# Patient Record
Sex: MALE | Race: WHITE | NOT HISPANIC OR LATINO
[De-identification: names, ages, dates, MRNs, and addresses within clinical notes are randomized per-mention and may not be internally consistent; named-entity substitution may affect disease eponyms.]

---

## 2020-03-25 PROBLEM — Z00.00 ENCOUNTER FOR PREVENTIVE HEALTH EXAMINATION: Status: ACTIVE | Noted: 2020-03-25

## 2020-04-07 ENCOUNTER — APPOINTMENT (OUTPATIENT)
Dept: SURGERY | Facility: CLINIC | Age: 65
End: 2020-04-07

## 2020-05-04 ENCOUNTER — APPOINTMENT (OUTPATIENT)
Dept: SURGERY | Facility: CLINIC | Age: 65
End: 2020-05-04
Payer: COMMERCIAL

## 2020-05-04 VITALS
DIASTOLIC BLOOD PRESSURE: 80 MMHG | SYSTOLIC BLOOD PRESSURE: 110 MMHG | WEIGHT: 160 LBS | HEIGHT: 66 IN | BODY MASS INDEX: 25.71 KG/M2

## 2020-05-04 DIAGNOSIS — R10.33 PERIUMBILICAL PAIN: ICD-10-CM

## 2020-05-04 DIAGNOSIS — I10 ESSENTIAL (PRIMARY) HYPERTENSION: ICD-10-CM

## 2020-05-04 DIAGNOSIS — Z72.0 TOBACCO USE: ICD-10-CM

## 2020-05-04 DIAGNOSIS — Z87.19 PERSONAL HISTORY OF OTHER DISEASES OF THE DIGESTIVE SYSTEM: ICD-10-CM

## 2020-05-04 PROCEDURE — 99203 OFFICE O/P NEW LOW 30 MIN: CPT

## 2020-05-04 RX ORDER — METFORMIN HYDROCHLORIDE 1000 MG/1
1000 TABLET, COATED ORAL
Refills: 0 | Status: ACTIVE | COMMUNITY

## 2020-05-04 RX ORDER — NIFEDIPINE 60 MG/1
60 TABLET, EXTENDED RELEASE ORAL
Refills: 0 | Status: ACTIVE | COMMUNITY

## 2020-05-04 RX ORDER — OMEPRAZOLE 40 MG/1
40 CAPSULE, DELAYED RELEASE ORAL
Refills: 0 | Status: ACTIVE | COMMUNITY

## 2020-05-04 RX ORDER — SACUBITRIL AND VALSARTAN 97; 103 MG/1; MG/1
97-103 TABLET, FILM COATED ORAL
Refills: 0 | Status: ACTIVE | COMMUNITY

## 2020-05-04 RX ORDER — METOPROLOL TARTRATE 50 MG/1
50 TABLET, FILM COATED ORAL
Refills: 0 | Status: ACTIVE | COMMUNITY

## 2020-05-04 NOTE — ASSESSMENT
[FreeTextEntry1] : After examination patient was explained about incisional hernia and options were given to the patient. Surgical option explaining in detail. Patient opted for surgery. After explaining the surgery informed consent was obtained. Surgery to be scheduled.

## 2020-05-04 NOTE — PHYSICAL EXAM
[No Rash or Lesion] : No rash or lesion [Alert] : alert [Oriented to Place] : oriented to place [Oriented to Person] : oriented to person [Oriented to Time] : oriented to time [Calm] : calm [de-identified] : 65-year-old male patient in no acute distress [de-identified] : patient has large rectus diastasis.patient also has large incisional hernia about the umbilicus. [de-identified] : Patient has large diastasis recti. Incisional hernia which is partially reducible around the belly button.

## 2020-05-04 NOTE — REVIEW OF SYSTEMS
[Constipation] : constipation [Negative] : Heme/Lymph [Abdominal Pain] : no abdominal pain [Vomiting] : no vomiting [Diarrhea] : no diarrhea

## 2020-05-04 NOTE — HISTORY OF PRESENT ILLNESS
[de-identified] : Patient presents to the office  with a history of bulge around his belly button. History of umbilical hernia surgery 20 years ago.

## 2020-05-04 NOTE — CONSULT LETTER
[Dear  ___] : Dear  [unfilled], [Consult Letter:] : I had the pleasure of evaluating your patient, [unfilled]. [Please see my note below.] : Please see my note below. [Sincerely,] : Sincerely, [Consult Closing:] : Thank you very much for allowing me to participate in the care of this patient.  If you have any questions, please do not hesitate to contact me. [FreeTextEntry3] : Yogesh Reyes MD, FACS\par [facility]\[place] [address]\par

## 2020-06-30 ENCOUNTER — RESULT REVIEW (OUTPATIENT)
Age: 65
End: 2020-06-30

## 2020-06-30 ENCOUNTER — OUTPATIENT (OUTPATIENT)
Dept: OUTPATIENT SERVICES | Facility: HOSPITAL | Age: 65
LOS: 1 days | Discharge: HOME | End: 2020-06-30
Payer: MEDICARE

## 2020-06-30 VITALS
OXYGEN SATURATION: 99 % | SYSTOLIC BLOOD PRESSURE: 153 MMHG | HEART RATE: 83 BPM | WEIGHT: 162.04 LBS | RESPIRATION RATE: 16 BRPM | HEIGHT: 66 IN | TEMPERATURE: 97 F | DIASTOLIC BLOOD PRESSURE: 89 MMHG

## 2020-06-30 DIAGNOSIS — K43.2 INCISIONAL HERNIA WITHOUT OBSTRUCTION OR GANGRENE: ICD-10-CM

## 2020-06-30 DIAGNOSIS — Z01.818 ENCOUNTER FOR OTHER PREPROCEDURAL EXAMINATION: ICD-10-CM

## 2020-06-30 DIAGNOSIS — R10.33 PERIUMBILICAL PAIN: ICD-10-CM

## 2020-06-30 DIAGNOSIS — Z98.890 OTHER SPECIFIED POSTPROCEDURAL STATES: Chronic | ICD-10-CM

## 2020-06-30 LAB
A1C WITH ESTIMATED AVERAGE GLUCOSE RESULT: 6.6 % — HIGH (ref 4–5.6)
ALBUMIN SERPL ELPH-MCNC: 4.6 G/DL — SIGNIFICANT CHANGE UP (ref 3.5–5.2)
ALP SERPL-CCNC: 99 U/L — SIGNIFICANT CHANGE UP (ref 30–115)
ALT FLD-CCNC: 19 U/L — SIGNIFICANT CHANGE UP (ref 0–41)
ANION GAP SERPL CALC-SCNC: 15 MMOL/L — HIGH (ref 7–14)
APPEARANCE UR: CLEAR — SIGNIFICANT CHANGE UP
APTT BLD: 31.8 SEC — SIGNIFICANT CHANGE UP (ref 27–39.2)
AST SERPL-CCNC: 20 U/L — SIGNIFICANT CHANGE UP (ref 0–41)
BASOPHILS # BLD AUTO: 0.06 K/UL — SIGNIFICANT CHANGE UP (ref 0–0.2)
BASOPHILS NFR BLD AUTO: 0.8 % — SIGNIFICANT CHANGE UP (ref 0–1)
BILIRUB SERPL-MCNC: 1.3 MG/DL — HIGH (ref 0.2–1.2)
BILIRUB UR-MCNC: NEGATIVE — SIGNIFICANT CHANGE UP
BUN SERPL-MCNC: 16 MG/DL — SIGNIFICANT CHANGE UP (ref 10–20)
CALCIUM SERPL-MCNC: 9.3 MG/DL — SIGNIFICANT CHANGE UP (ref 8.5–10.1)
CHLORIDE SERPL-SCNC: 99 MMOL/L — SIGNIFICANT CHANGE UP (ref 98–110)
CO2 SERPL-SCNC: 25 MMOL/L — SIGNIFICANT CHANGE UP (ref 17–32)
COLOR SPEC: YELLOW — SIGNIFICANT CHANGE UP
CREAT SERPL-MCNC: 1.1 MG/DL — SIGNIFICANT CHANGE UP (ref 0.7–1.5)
DIFF PNL FLD: NEGATIVE — SIGNIFICANT CHANGE UP
EOSINOPHIL # BLD AUTO: 0.13 K/UL — SIGNIFICANT CHANGE UP (ref 0–0.7)
EOSINOPHIL NFR BLD AUTO: 1.7 % — SIGNIFICANT CHANGE UP (ref 0–8)
ESTIMATED AVERAGE GLUCOSE: 143 MG/DL — HIGH (ref 68–114)
GLUCOSE SERPL-MCNC: 165 MG/DL — HIGH (ref 70–99)
GLUCOSE UR QL: SIGNIFICANT CHANGE UP
HCT VFR BLD CALC: 42.9 % — SIGNIFICANT CHANGE UP (ref 42–52)
HGB BLD-MCNC: 14.8 G/DL — SIGNIFICANT CHANGE UP (ref 14–18)
IMM GRANULOCYTES NFR BLD AUTO: 0.3 % — SIGNIFICANT CHANGE UP (ref 0.1–0.3)
INR BLD: 0.98 RATIO — SIGNIFICANT CHANGE UP (ref 0.65–1.3)
KETONES UR-MCNC: NEGATIVE — SIGNIFICANT CHANGE UP
LEUKOCYTE ESTERASE UR-ACNC: NEGATIVE — SIGNIFICANT CHANGE UP
LYMPHOCYTES # BLD AUTO: 2.26 K/UL — SIGNIFICANT CHANGE UP (ref 1.2–3.4)
LYMPHOCYTES # BLD AUTO: 29.4 % — SIGNIFICANT CHANGE UP (ref 20.5–51.1)
MCHC RBC-ENTMCNC: 32.9 PG — HIGH (ref 27–31)
MCHC RBC-ENTMCNC: 34.5 G/DL — SIGNIFICANT CHANGE UP (ref 32–37)
MCV RBC AUTO: 95.3 FL — HIGH (ref 80–94)
MONOCYTES # BLD AUTO: 0.74 K/UL — HIGH (ref 0.1–0.6)
MONOCYTES NFR BLD AUTO: 9.6 % — HIGH (ref 1.7–9.3)
NEUTROPHILS # BLD AUTO: 4.49 K/UL — SIGNIFICANT CHANGE UP (ref 1.4–6.5)
NEUTROPHILS NFR BLD AUTO: 58.2 % — SIGNIFICANT CHANGE UP (ref 42.2–75.2)
NITRITE UR-MCNC: NEGATIVE — SIGNIFICANT CHANGE UP
NRBC # BLD: 0 /100 WBCS — SIGNIFICANT CHANGE UP (ref 0–0)
PH UR: 7 — SIGNIFICANT CHANGE UP (ref 5–8)
PLATELET # BLD AUTO: 159 K/UL — SIGNIFICANT CHANGE UP (ref 130–400)
POTASSIUM SERPL-MCNC: 4.3 MMOL/L — SIGNIFICANT CHANGE UP (ref 3.5–5)
POTASSIUM SERPL-SCNC: 4.3 MMOL/L — SIGNIFICANT CHANGE UP (ref 3.5–5)
PROT SERPL-MCNC: 7.6 G/DL — SIGNIFICANT CHANGE UP (ref 6–8)
PROT UR-MCNC: SIGNIFICANT CHANGE UP
PROTHROM AB SERPL-ACNC: 11.3 SEC — SIGNIFICANT CHANGE UP (ref 9.95–12.87)
RBC # BLD: 4.5 M/UL — LOW (ref 4.7–6.1)
RBC # FLD: 12.7 % — SIGNIFICANT CHANGE UP (ref 11.5–14.5)
SODIUM SERPL-SCNC: 139 MMOL/L — SIGNIFICANT CHANGE UP (ref 135–146)
SP GR SPEC: 1.02 — SIGNIFICANT CHANGE UP (ref 1.01–1.02)
UROBILINOGEN FLD QL: SIGNIFICANT CHANGE UP
WBC # BLD: 7.7 K/UL — SIGNIFICANT CHANGE UP (ref 4.8–10.8)
WBC # FLD AUTO: 7.7 K/UL — SIGNIFICANT CHANGE UP (ref 4.8–10.8)

## 2020-06-30 PROCEDURE — 71046 X-RAY EXAM CHEST 2 VIEWS: CPT | Mod: 26

## 2020-06-30 PROCEDURE — 93010 ELECTROCARDIOGRAM REPORT: CPT

## 2020-06-30 NOTE — H&P PST ADULT - NSICDXPASTMEDICALHX_GEN_ALL_CORE_FT
PAST MEDICAL HISTORY:  BPH (benign prostatic hyperplasia)     DM (diabetes mellitus)     High cholesterol     HTN (hypertension)     PVD (peripheral vascular disease)

## 2020-06-30 NOTE — H&P PST ADULT - HISTORY OF PRESENT ILLNESS
Pt states he had this surgery in the past but hernia returned. Denies any chest pain, difficulty breathing, SOB, palpitations, dysuria, URI, or any other infections in the last 2 weeks. Denies any recent travel, contact, or exposure to any persons with known or suspected COVID-19. Pt also denies COVID testing within the last 2 weeks. Exercise tolerance of 1 flights of stairs without dyspnea. GUY reviewed with patient.

## 2020-07-17 PROBLEM — E78.00 PURE HYPERCHOLESTEROLEMIA, UNSPECIFIED: Chronic | Status: ACTIVE | Noted: 2020-06-30

## 2020-07-17 PROBLEM — N40.0 BENIGN PROSTATIC HYPERPLASIA WITHOUT LOWER URINARY TRACT SYMPTOMS: Chronic | Status: ACTIVE | Noted: 2020-06-30

## 2020-07-17 PROBLEM — I10 ESSENTIAL (PRIMARY) HYPERTENSION: Chronic | Status: ACTIVE | Noted: 2020-06-30

## 2020-07-17 PROBLEM — I73.9 PERIPHERAL VASCULAR DISEASE, UNSPECIFIED: Chronic | Status: ACTIVE | Noted: 2020-06-30

## 2020-07-17 PROBLEM — E11.9 TYPE 2 DIABETES MELLITUS WITHOUT COMPLICATIONS: Chronic | Status: ACTIVE | Noted: 2020-06-30

## 2020-07-24 ENCOUNTER — OUTPATIENT (OUTPATIENT)
Dept: OUTPATIENT SERVICES | Facility: HOSPITAL | Age: 65
LOS: 1 days | Discharge: HOME | End: 2020-07-24

## 2020-07-24 VITALS
HEART RATE: 77 BPM | WEIGHT: 160.94 LBS | RESPIRATION RATE: 18 BRPM | TEMPERATURE: 96 F | SYSTOLIC BLOOD PRESSURE: 138 MMHG | OXYGEN SATURATION: 98 % | HEIGHT: 66 IN | DIASTOLIC BLOOD PRESSURE: 92 MMHG

## 2020-07-24 DIAGNOSIS — Z98.890 OTHER SPECIFIED POSTPROCEDURAL STATES: Chronic | ICD-10-CM

## 2020-07-24 DIAGNOSIS — Z01.818 ENCOUNTER FOR OTHER PREPROCEDURAL EXAMINATION: ICD-10-CM

## 2020-07-24 DIAGNOSIS — K43.2 INCISIONAL HERNIA WITHOUT OBSTRUCTION OR GANGRENE: ICD-10-CM

## 2020-07-24 LAB
APPEARANCE UR: CLEAR — SIGNIFICANT CHANGE UP
BILIRUB UR-MCNC: NEGATIVE — SIGNIFICANT CHANGE UP
COLOR SPEC: SIGNIFICANT CHANGE UP
DIFF PNL FLD: NEGATIVE — SIGNIFICANT CHANGE UP
GLUCOSE UR QL: ABNORMAL
KETONES UR-MCNC: NEGATIVE — SIGNIFICANT CHANGE UP
LEUKOCYTE ESTERASE UR-ACNC: NEGATIVE — SIGNIFICANT CHANGE UP
NITRITE UR-MCNC: NEGATIVE — SIGNIFICANT CHANGE UP
PH UR: 7 — SIGNIFICANT CHANGE UP (ref 5–8)
PROT UR-MCNC: SIGNIFICANT CHANGE UP
SP GR SPEC: 1.01 — SIGNIFICANT CHANGE UP (ref 1.01–1.02)
UROBILINOGEN FLD QL: SIGNIFICANT CHANGE UP

## 2020-07-24 NOTE — H&P PST ADULT - HISTORY OF PRESENT ILLNESS
PATIENT CURRENTLY DENIES CHEST PAIN  SHORTNESS OF BREATH PALPITATIONS,  DYSURIA, OR UPPER RESPIRATORY INFECTION IN PAST 2 WEEKS  EXERCISE  TOLERANCE  1-2 FLIGHT OF STAIRS  WITHOUT SHORTNESS OF BREATH     denies travel outside the [place] in the past 30 days     pt denies any covid s/s, or tested positive in the past 2 weeks

## 2020-07-26 LAB
CULTURE RESULTS: NO GROWTH — SIGNIFICANT CHANGE UP
SPECIMEN SOURCE: SIGNIFICANT CHANGE UP

## 2020-08-03 ENCOUNTER — APPOINTMENT (OUTPATIENT)
Dept: SURGERY | Facility: HOSPITAL | Age: 65
End: 2020-08-03

## 2020-08-11 ENCOUNTER — APPOINTMENT (OUTPATIENT)
Dept: SURGERY | Facility: CLINIC | Age: 65
End: 2020-08-11

## 2020-09-29 ENCOUNTER — APPOINTMENT (OUTPATIENT)
Dept: SURGERY | Facility: CLINIC | Age: 65
End: 2020-09-29
Payer: COMMERCIAL

## 2020-09-29 VITALS
TEMPERATURE: 97.9 F | HEIGHT: 66 IN | OXYGEN SATURATION: 98 % | BODY MASS INDEX: 26.52 KG/M2 | WEIGHT: 165 LBS | HEART RATE: 88 BPM

## 2020-09-29 PROCEDURE — 99213 OFFICE O/P EST LOW 20 MIN: CPT

## 2020-10-20 ENCOUNTER — APPOINTMENT (OUTPATIENT)
Dept: SURGERY | Facility: CLINIC | Age: 65
End: 2020-10-20
Payer: COMMERCIAL

## 2020-10-20 VITALS
WEIGHT: 165 LBS | HEART RATE: 88 BPM | BODY MASS INDEX: 26.52 KG/M2 | TEMPERATURE: 97.9 F | OXYGEN SATURATION: 89 % | HEIGHT: 66 IN

## 2020-10-20 DIAGNOSIS — M62.08 SEPARATION OF MUSCLE (NONTRAUMATIC), OTHER SITE: ICD-10-CM

## 2020-10-20 PROCEDURE — 99214 OFFICE O/P EST MOD 30 MIN: CPT

## 2020-10-20 NOTE — PHYSICAL EXAM
[Abdominal Masses] : No abdominal masses [Abdomen Tenderness] : ~T ~M No abdominal tenderness [No Rash or Lesion] : No rash or lesion [Alert] : alert [Oriented to Person] : oriented to person [Oriented to Place] : oriented to place [Oriented to Time] : oriented to time [Calm] : calm [de-identified] : NAD [de-identified] : Incisional hernia approximately 2 cm above the umbilicus. Small umbilical hernia. Large diastasis

## 2020-10-20 NOTE — ASSESSMENT
[FreeTextEntry1] : After examination patient has explained about both hernias. He was explained about diastasis . Recurrence  was explained in detail. Options were discussed. Surgical option explaining detail. Informed consent was obtained. Surgery will be scheduled.

## 2020-11-02 ENCOUNTER — OUTPATIENT (OUTPATIENT)
Dept: OUTPATIENT SERVICES | Facility: HOSPITAL | Age: 65
LOS: 1 days | Discharge: HOME | End: 2020-11-02

## 2020-11-02 VITALS
TEMPERATURE: 98 F | SYSTOLIC BLOOD PRESSURE: 140 MMHG | OXYGEN SATURATION: 97 % | DIASTOLIC BLOOD PRESSURE: 94 MMHG | HEART RATE: 90 BPM | HEIGHT: 66 IN | RESPIRATION RATE: 16 BRPM | WEIGHT: 160.06 LBS

## 2020-11-02 DIAGNOSIS — Z01.818 ENCOUNTER FOR OTHER PREPROCEDURAL EXAMINATION: ICD-10-CM

## 2020-11-02 DIAGNOSIS — R10.33 PERIUMBILICAL PAIN: ICD-10-CM

## 2020-11-02 DIAGNOSIS — Z98.890 OTHER SPECIFIED POSTPROCEDURAL STATES: Chronic | ICD-10-CM

## 2020-11-02 DIAGNOSIS — Z96.649 PRESENCE OF UNSPECIFIED ARTIFICIAL HIP JOINT: Chronic | ICD-10-CM

## 2020-11-02 DIAGNOSIS — K43.2 INCISIONAL HERNIA WITHOUT OBSTRUCTION OR GANGRENE: ICD-10-CM

## 2020-11-02 LAB
ALBUMIN SERPL ELPH-MCNC: 4.6 G/DL — SIGNIFICANT CHANGE UP (ref 3.5–5.2)
ALP SERPL-CCNC: 99 U/L — SIGNIFICANT CHANGE UP (ref 30–115)
ALT FLD-CCNC: 16 U/L — SIGNIFICANT CHANGE UP (ref 0–41)
ANION GAP SERPL CALC-SCNC: 6 MMOL/L — LOW (ref 7–14)
APPEARANCE UR: CLEAR — SIGNIFICANT CHANGE UP
AST SERPL-CCNC: 18 U/L — SIGNIFICANT CHANGE UP (ref 0–41)
BASOPHILS # BLD AUTO: 0.06 K/UL — SIGNIFICANT CHANGE UP (ref 0–0.2)
BASOPHILS NFR BLD AUTO: 1 % — SIGNIFICANT CHANGE UP (ref 0–1)
BILIRUB SERPL-MCNC: 1 MG/DL — SIGNIFICANT CHANGE UP (ref 0.2–1.2)
BILIRUB UR-MCNC: NEGATIVE — SIGNIFICANT CHANGE UP
BUN SERPL-MCNC: 16 MG/DL — SIGNIFICANT CHANGE UP (ref 10–20)
CALCIUM SERPL-MCNC: 9.3 MG/DL — SIGNIFICANT CHANGE UP (ref 8.5–10.1)
CHLORIDE SERPL-SCNC: 101 MMOL/L — SIGNIFICANT CHANGE UP (ref 98–110)
CO2 SERPL-SCNC: 26 MMOL/L — SIGNIFICANT CHANGE UP (ref 17–32)
COLOR SPEC: SIGNIFICANT CHANGE UP
CREAT SERPL-MCNC: 1.2 MG/DL — SIGNIFICANT CHANGE UP (ref 0.7–1.5)
DIFF PNL FLD: NEGATIVE — SIGNIFICANT CHANGE UP
EOSINOPHIL # BLD AUTO: 0.22 K/UL — SIGNIFICANT CHANGE UP (ref 0–0.7)
EOSINOPHIL NFR BLD AUTO: 3.6 % — SIGNIFICANT CHANGE UP (ref 0–8)
GLUCOSE SERPL-MCNC: 199 MG/DL — HIGH (ref 70–99)
GLUCOSE UR QL: ABNORMAL
HCT VFR BLD CALC: 44 % — SIGNIFICANT CHANGE UP (ref 42–52)
HGB BLD-MCNC: 14.9 G/DL — SIGNIFICANT CHANGE UP (ref 14–18)
IMM GRANULOCYTES NFR BLD AUTO: 0.2 % — SIGNIFICANT CHANGE UP (ref 0.1–0.3)
KETONES UR-MCNC: NEGATIVE — SIGNIFICANT CHANGE UP
LEUKOCYTE ESTERASE UR-ACNC: NEGATIVE — SIGNIFICANT CHANGE UP
LYMPHOCYTES # BLD AUTO: 1.34 K/UL — SIGNIFICANT CHANGE UP (ref 1.2–3.4)
LYMPHOCYTES # BLD AUTO: 22.1 % — SIGNIFICANT CHANGE UP (ref 20.5–51.1)
MCHC RBC-ENTMCNC: 32 PG — HIGH (ref 27–31)
MCHC RBC-ENTMCNC: 33.9 G/DL — SIGNIFICANT CHANGE UP (ref 32–37)
MCV RBC AUTO: 94.4 FL — HIGH (ref 80–94)
MONOCYTES # BLD AUTO: 0.47 K/UL — SIGNIFICANT CHANGE UP (ref 0.1–0.6)
MONOCYTES NFR BLD AUTO: 7.8 % — SIGNIFICANT CHANGE UP (ref 1.7–9.3)
NEUTROPHILS # BLD AUTO: 3.95 K/UL — SIGNIFICANT CHANGE UP (ref 1.4–6.5)
NEUTROPHILS NFR BLD AUTO: 65.3 % — SIGNIFICANT CHANGE UP (ref 42.2–75.2)
NITRITE UR-MCNC: NEGATIVE — SIGNIFICANT CHANGE UP
NRBC # BLD: 0 /100 WBCS — SIGNIFICANT CHANGE UP (ref 0–0)
PH UR: 7.5 — SIGNIFICANT CHANGE UP (ref 5–8)
PLATELET # BLD AUTO: 168 K/UL — SIGNIFICANT CHANGE UP (ref 130–400)
POTASSIUM SERPL-MCNC: 4.6 MMOL/L — SIGNIFICANT CHANGE UP (ref 3.5–5)
POTASSIUM SERPL-SCNC: 4.6 MMOL/L — SIGNIFICANT CHANGE UP (ref 3.5–5)
PROT SERPL-MCNC: 7.5 G/DL — SIGNIFICANT CHANGE UP (ref 6–8)
PROT UR-MCNC: SIGNIFICANT CHANGE UP
RBC # BLD: 4.66 M/UL — LOW (ref 4.7–6.1)
RBC # FLD: 12.6 % — SIGNIFICANT CHANGE UP (ref 11.5–14.5)
SODIUM SERPL-SCNC: 133 MMOL/L — LOW (ref 135–146)
SP GR SPEC: 1.02 — SIGNIFICANT CHANGE UP (ref 1.01–1.03)
UROBILINOGEN FLD QL: SIGNIFICANT CHANGE UP
WBC # BLD: 6.05 K/UL — SIGNIFICANT CHANGE UP (ref 4.8–10.8)
WBC # FLD AUTO: 6.05 K/UL — SIGNIFICANT CHANGE UP (ref 4.8–10.8)

## 2020-11-02 RX ORDER — OMEPRAZOLE 10 MG/1
1 CAPSULE, DELAYED RELEASE ORAL
Qty: 0 | Refills: 0 | DISCHARGE

## 2020-11-02 NOTE — H&P PST ADULT - HISTORY OF PRESENT ILLNESS
65yr old male states " I have this hernia near my belly button for about 20yrs -presents to pretesting for Incisional and umbilical hernia repair with mesh. Same surgery was cancelled in 7/2020 needed med eval and card eval. Denies COVID S/S. Denies sick contacts. Advised to follow preventive measures for COVID- Verbalized understanding of instructions. Exercise dharmesh 2 FOS slowly d/t pain B/L LE.  Anesthesia Alert  NO--Difficult Airway  NO--History of neck surgery or radiation  YES--Limited ROM of neck  NO--History of Malignant hyperthermia  NO--Personal or family history of Pseudocholinesterase deficiency  NO--Prior Anesthesia Complication  NO--Latex Allergy  NO--Loose teeth  NO--History of Rheumatoid Arthritis  NO--GUY  YES Kyphosis--Other_____

## 2020-11-02 NOTE — H&P PST ADULT - NSICDXFAMILYHX_GEN_ALL_CORE_FT
FAMILY HISTORY:  Family history of diabetes mellitus (DM)  FH: heart disease  FH: HTN (hypertension)

## 2020-11-02 NOTE — H&P PST ADULT - NSICDXPASTMEDICALHX_GEN_ALL_CORE_FT
PAST MEDICAL HISTORY:  BPH (benign prostatic hyperplasia)     Chronic GERD     DM (diabetes mellitus)     H/O heart valve insufficiency     H/O varicose veins bleeding from B/L LE    High cholesterol     HTN (hypertension)     OA (osteoarthritis)     PVD (peripheral vascular disease)

## 2020-11-18 ENCOUNTER — NON-APPOINTMENT (OUTPATIENT)
Age: 65
End: 2020-11-18

## 2020-11-20 ENCOUNTER — LABORATORY RESULT (OUTPATIENT)
Age: 65
End: 2020-11-20

## 2020-11-20 ENCOUNTER — OUTPATIENT (OUTPATIENT)
Dept: OUTPATIENT SERVICES | Facility: HOSPITAL | Age: 65
LOS: 1 days | Discharge: HOME | End: 2020-11-20

## 2020-11-20 DIAGNOSIS — Z11.59 ENCOUNTER FOR SCREENING FOR OTHER VIRAL DISEASES: ICD-10-CM

## 2020-11-20 DIAGNOSIS — Z96.649 PRESENCE OF UNSPECIFIED ARTIFICIAL HIP JOINT: Chronic | ICD-10-CM

## 2020-11-20 DIAGNOSIS — Z98.890 OTHER SPECIFIED POSTPROCEDURAL STATES: Chronic | ICD-10-CM

## 2020-11-20 PROBLEM — M19.90 UNSPECIFIED OSTEOARTHRITIS, UNSPECIFIED SITE: Chronic | Status: ACTIVE | Noted: 2020-11-02

## 2020-11-20 PROBLEM — K21.9 GASTRO-ESOPHAGEAL REFLUX DISEASE WITHOUT ESOPHAGITIS: Chronic | Status: ACTIVE | Noted: 2020-11-02

## 2020-11-20 PROBLEM — Z86.79 PERSONAL HISTORY OF OTHER DISEASES OF THE CIRCULATORY SYSTEM: Chronic | Status: ACTIVE | Noted: 2020-11-02

## 2020-11-23 ENCOUNTER — OUTPATIENT (OUTPATIENT)
Dept: OUTPATIENT SERVICES | Facility: HOSPITAL | Age: 65
LOS: 1 days | Discharge: HOME | End: 2020-11-23
Payer: MEDICARE

## 2020-11-23 ENCOUNTER — APPOINTMENT (OUTPATIENT)
Dept: SURGERY | Facility: HOSPITAL | Age: 65
End: 2020-11-23

## 2020-11-23 VITALS
TEMPERATURE: 97 F | DIASTOLIC BLOOD PRESSURE: 88 MMHG | SYSTOLIC BLOOD PRESSURE: 154 MMHG | RESPIRATION RATE: 20 BRPM | HEIGHT: 66 IN | OXYGEN SATURATION: 96 % | WEIGHT: 160.06 LBS | HEART RATE: 81 BPM

## 2020-11-23 VITALS — SYSTOLIC BLOOD PRESSURE: 136 MMHG | DIASTOLIC BLOOD PRESSURE: 79 MMHG | RESPIRATION RATE: 17 BRPM | HEART RATE: 102 BPM

## 2020-11-23 DIAGNOSIS — Z96.649 PRESENCE OF UNSPECIFIED ARTIFICIAL HIP JOINT: Chronic | ICD-10-CM

## 2020-11-23 DIAGNOSIS — Z98.890 OTHER SPECIFIED POSTPROCEDURAL STATES: Chronic | ICD-10-CM

## 2020-11-23 LAB — GLUCOSE BLDC GLUCOMTR-MCNC: 175 MG/DL — HIGH (ref 70–99)

## 2020-11-23 PROCEDURE — 49585: CPT | Mod: 59

## 2020-11-23 PROCEDURE — 49560: CPT

## 2020-11-23 PROCEDURE — 49568: CPT | Mod: 59

## 2020-11-23 RX ORDER — OXYCODONE AND ACETAMINOPHEN 5; 325 MG/1; MG/1
1 TABLET ORAL
Qty: 10 | Refills: 0
Start: 2020-11-23 | End: 2020-11-25

## 2020-11-23 RX ORDER — SODIUM CHLORIDE 9 MG/ML
1000 INJECTION, SOLUTION INTRAVENOUS
Refills: 0 | Status: DISCONTINUED | OUTPATIENT
Start: 2020-11-23 | End: 2020-12-07

## 2020-11-23 RX ORDER — HYDROMORPHONE HYDROCHLORIDE 2 MG/ML
0.5 INJECTION INTRAMUSCULAR; INTRAVENOUS; SUBCUTANEOUS
Refills: 0 | Status: DISCONTINUED | OUTPATIENT
Start: 2020-11-23 | End: 2020-11-23

## 2020-11-23 RX ORDER — HYDROMORPHONE HYDROCHLORIDE 2 MG/ML
1 INJECTION INTRAMUSCULAR; INTRAVENOUS; SUBCUTANEOUS
Refills: 0 | Status: DISCONTINUED | OUTPATIENT
Start: 2020-11-23 | End: 2020-11-23

## 2020-11-23 RX ORDER — OXYCODONE HYDROCHLORIDE 5 MG/1
5 TABLET ORAL ONCE
Refills: 0 | Status: DISCONTINUED | OUTPATIENT
Start: 2020-11-23 | End: 2020-11-23

## 2020-11-23 RX ORDER — ONDANSETRON 8 MG/1
4 TABLET, FILM COATED ORAL ONCE
Refills: 0 | Status: COMPLETED | OUTPATIENT
Start: 2020-11-23 | End: 2020-11-23

## 2020-11-23 RX ADMIN — ONDANSETRON 4 MILLIGRAM(S): 8 TABLET, FILM COATED ORAL at 14:28

## 2020-11-23 RX ADMIN — SODIUM CHLORIDE 100 MILLILITER(S): 9 INJECTION, SOLUTION INTRAVENOUS at 13:36

## 2020-11-23 NOTE — ASU DISCHARGE PLAN (ADULT/PEDIATRIC) - CALL YOUR DOCTOR IF YOU HAVE ANY OF THE FOLLOWING:
Inability to tolerate liquids or foods/Fever greater than (need to indicate Fahrenheit or Celsius)/Nausea and vomiting that does not stop/Wound/Surgical Site with redness, or foul smelling discharge or pus/Bleeding that does not stop/Swelling that gets worse/Pain not relieved by Medications

## 2020-11-23 NOTE — BRIEF OPERATIVE NOTE - NSICDXBRIEFPOSTOP_GEN_ALL_CORE_FT
POST-OP DIAGNOSIS:  Incisional hernia 23-Nov-2020 13:05:03  Yogesh Reyes  Umbilical hernia 23-Nov-2020 13:04:54  Yogesh Reyes

## 2020-11-23 NOTE — ASU DISCHARGE PLAN (ADULT/PEDIATRIC) - ASU DC SPECIAL INSTRUCTIONSFT
You are being discharged from [facility]. Please contact the phone number provided and schedule a follow-up appointment in Dr. Reyes's office. Please continue to take your home medications. Take over the counter Ibuprofen for pain control. You have been prescribed Percocet for breakthrough pain relief, please only take as needed please take as directed. You may remove your dressing in 48 hours, please leave the underlying steri strips in place, they will fall off on their own. Please avoid heavy weight lifting for the next 4-6 weeks.  If you have any further questions about your care, please do not hesitate to contact Dr. Reyes's office.

## 2020-11-23 NOTE — ASU PREOP CHECKLIST - BMI (KG/M2)
ROSEMARIE WELCH phone outreach with pt's spouse. She was working on the [employer] and has misplaced it. Requests ROSEMARIE WELCH mail another application to her home. Mailed today. MOWs has started. ROSEMARIE WELCH will assist pt in obtaining a bariatric BSC once they save up the $100 needed for the upcharge given pt does not meet weight requirement needed for insurance to pay for it. Nhi Cross RN CM updated.      PLAN  Outreach in 2 weeks to follow up on status of [facility] [place]          This note will not be viewable in [address]
25.8

## 2020-11-23 NOTE — ASU DISCHARGE PLAN (ADULT/PEDIATRIC) - CARE PROVIDER_API CALL
Yogesh Reyes  General Surgery  [address] [place] [address]  Phone: [phone]  Fax: [phone]  Follow Up Time:

## 2020-11-23 NOTE — BRIEF OPERATIVE NOTE - NSICDXBRIEFPROCEDURE_GEN_ALL_CORE_FT
PROCEDURES:  Repair, hernia, umbilical, adult 23-Nov-2020 13:03:52  Yogesh Reyes  Incisional hernia repair with mesh 23-Nov-2020 13:03:33  Yogesh Reyes  Incisional hernia repair 23-Nov-2020 13:03:24  Yogesh Reyes

## 2020-11-23 NOTE — BRIEF OPERATIVE NOTE - NSICDXBRIEFPREOP_GEN_ALL_CORE_FT
PRE-OP DIAGNOSIS:  Umbilical hernia 23-Nov-2020 13:04:40  Yogesh Reyes  Incisional hernia 23-Nov-2020 13:04:07  Yogesh Reyes

## 2020-11-26 DIAGNOSIS — I10 ESSENTIAL (PRIMARY) HYPERTENSION: ICD-10-CM

## 2020-11-26 DIAGNOSIS — K21.9 GASTRO-ESOPHAGEAL REFLUX DISEASE WITHOUT ESOPHAGITIS: ICD-10-CM

## 2020-11-26 DIAGNOSIS — N40.0 BENIGN PROSTATIC HYPERPLASIA WITHOUT LOWER URINARY TRACT SYMPTOMS: ICD-10-CM

## 2020-11-26 DIAGNOSIS — E78.00 PURE HYPERCHOLESTEROLEMIA, UNSPECIFIED: ICD-10-CM

## 2020-11-26 DIAGNOSIS — E11.51 TYPE 2 DIABETES MELLITUS WITH DIABETIC PERIPHERAL ANGIOPATHY WITHOUT GANGRENE: ICD-10-CM

## 2020-11-26 DIAGNOSIS — K43.2 INCISIONAL HERNIA WITHOUT OBSTRUCTION OR GANGRENE: ICD-10-CM

## 2020-11-26 DIAGNOSIS — Z96.649 PRESENCE OF UNSPECIFIED ARTIFICIAL HIP JOINT: ICD-10-CM

## 2020-11-26 DIAGNOSIS — Z79.84 LONG TERM (CURRENT) USE OF ORAL HYPOGLYCEMIC DRUGS: ICD-10-CM

## 2020-11-26 DIAGNOSIS — M19.90 UNSPECIFIED OSTEOARTHRITIS, UNSPECIFIED SITE: ICD-10-CM

## 2020-11-26 DIAGNOSIS — Z87.891 PERSONAL HISTORY OF NICOTINE DEPENDENCE: ICD-10-CM

## 2020-11-26 DIAGNOSIS — K42.9 UMBILICAL HERNIA WITHOUT OBSTRUCTION OR GANGRENE: ICD-10-CM

## 2020-12-03 ENCOUNTER — APPOINTMENT (OUTPATIENT)
Dept: SURGERY | Facility: CLINIC | Age: 65
End: 2020-12-03
Payer: COMMERCIAL

## 2020-12-03 DIAGNOSIS — K43.2 INCISIONAL HERNIA W/OUT OBSTRUCTION OR GANGRENE: ICD-10-CM

## 2020-12-03 PROCEDURE — 99024 POSTOP FOLLOW-UP VISIT: CPT

## 2021-10-12 ENCOUNTER — LABORATORY RESULT (OUTPATIENT)
Age: 66
End: 2021-10-12

## 2021-10-12 ENCOUNTER — APPOINTMENT (OUTPATIENT)
Dept: UROLOGY | Facility: CLINIC | Age: 66
End: 2021-10-12
Payer: COMMERCIAL

## 2021-10-12 VITALS — HEIGHT: 64 IN | BODY MASS INDEX: 27.31 KG/M2 | WEIGHT: 160 LBS

## 2021-10-12 DIAGNOSIS — N39.43 POST-VOID DRIBBLING: ICD-10-CM

## 2021-10-12 PROCEDURE — 99204 OFFICE O/P NEW MOD 45 MIN: CPT

## 2021-10-12 RX ORDER — HYDROCORTISONE 1 %
12 CREAM (GRAM) TOPICAL
Qty: 227 | Refills: 0 | Status: ACTIVE | COMMUNITY
Start: 2021-05-25

## 2021-10-12 RX ORDER — METOPROLOL SUCCINATE 50 MG/1
50 TABLET, EXTENDED RELEASE ORAL
Qty: 180 | Refills: 0 | Status: ACTIVE | COMMUNITY
Start: 2021-05-20

## 2021-10-12 RX ORDER — NAPROXEN 500 MG/1
500 TABLET ORAL
Qty: 40 | Refills: 0 | Status: ACTIVE | COMMUNITY
Start: 2021-08-26

## 2021-10-12 RX ORDER — METHYLPREDNISOLONE 4 MG/1
4 TABLET ORAL
Qty: 21 | Refills: 0 | Status: ACTIVE | COMMUNITY
Start: 2021-09-20

## 2021-10-12 RX ORDER — NIFEDIPINE 60 MG/1
60 TABLET, FILM COATED, EXTENDED RELEASE ORAL
Qty: 90 | Refills: 0 | Status: ACTIVE | COMMUNITY
Start: 2021-05-08

## 2021-10-12 RX ORDER — PANTOPRAZOLE 40 MG/1
40 TABLET, DELAYED RELEASE ORAL
Qty: 90 | Refills: 0 | Status: ACTIVE | COMMUNITY
Start: 2021-04-28

## 2021-10-12 RX ORDER — BLOOD SUGAR DIAGNOSTIC
STRIP MISCELLANEOUS
Qty: 50 | Refills: 0 | Status: ACTIVE | COMMUNITY
Start: 2020-10-16

## 2021-10-12 NOTE — PHYSICAL EXAM
[General Appearance - Well Developed] : well developed [General Appearance - Well Nourished] : well nourished [Normal Appearance] : normal appearance [Well Groomed] : well groomed [General Appearance - In No Acute Distress] : no acute distress [Edema] : no peripheral edema [Respiration, Rhythm And Depth] : normal respiratory rhythm and effort [Exaggerated Use Of Accessory Muscles For Inspiration] : no accessory muscle use [Abdomen Soft] : soft [Abdomen Tenderness] : non-tender [Costovertebral Angle Tenderness] : no ~M costovertebral angle tenderness [Urethral Meatus] : meatus normal [Urinary Bladder Findings] : the bladder was normal on palpation [Scrotum] : the scrotum was normal [Testes Mass (___cm)] : there were no testicular masses [No Prostate Nodules] : no prostate nodules [Prostate Size ___ gm] : prostate size [unfilled] gm [Normal Station and Gait] : the gait and station were normal for the patient's age [] : no rash [No Focal Deficits] : no focal deficits [Oriented To Time, Place, And Person] : oriented to person, place, and time [Affect] : the affect was normal [Mood] : the mood was normal [Not Anxious] : not anxious [No Palpable Adenopathy] : no palpable adenopathy [FreeTextEntry1] : Lt epidyddimal cyst vs Lt hydrocele

## 2021-10-12 NOTE — ASSESSMENT
[FreeTextEntry1] : MARILYN MORELOS is a 66 year old male prediabetic, who presents for consultation for BPH/LUTS on rapaflo/finasteride previously seen by outside urologist presents here for worsening LUTS, and elevated PSA. \par \par Plan: \par bladder/prostate sono to asses the prostate Volume \par PSA \par may need mri prostate as he had not had\par Future consider TURP procedure for the worsening LUTS related to BPH. \par Conservative measures: avoid bladder irritants. \par UA and UCx today \par f/u in 1 month

## 2021-10-12 NOTE — ADDENDUM
[FreeTextEntry1] : Patient's note was transcribed with the assistance of a medical scribe under the supervision of Dr. Porter.\par I, Dr. Porter, have reviewed the patient's chart and agree that it aligns with my medical decisions.\par Kevin Jenkins, our scribe, also served as a chaperone for physical examination purposes.\par \par \par

## 2021-10-12 NOTE — HISTORY OF PRESENT ILLNESS
[FreeTextEntry1] : MARILYN MORELOS is a 66 year old male prediabetic, who presents for consultation for BPH/LUTS on rapaflo/finasteride previously seen by outside urologist.\par \par Pt is presenting for frequent urination, he tried Myrbetriq at some point with no improvement in symptoms, actually felt inability to urinate.  Pt then reports he started taking Finasteride since 2018 with improved symptoms, and pt reports he is also Taking rapaflo.\par Pt reports the symptoms consist of increased frequent urination, UUI and reports nocturia 0-1x. moderate weak stream. bothersome.\par Denies gross hematuria, dysuria or associated symptoms. \par \par Denies  PMH including previous kidney stones, recurrent UTIs. \par Family History: No  malignancies. Brother had TURP procedure. \par Social History: School safety. Pt reports he is not consuming bladder irritants. \par \par PSA=3.0  percent free 30 from 12/2020 \par PSA=4.5  11/ 2018 \par PSA=6  10/ 2018 \par \par Prostate TRUS: 45 grams prostate from 2014 \par \par UDS from 2018 demonstrates: normal Bladder compliance, max V=121 cc. Voiding phase, max detrusor pressure 194 cc, Qmax= 8 ml/sec. Tracing were reviewed: there is a sustained detrusor contraction, machine does not appear to be completely calibrated, somewhat difficult to interpret. \par \par RBUS images visualized 7/2020: no hydronephrosis bL no stones BL, bladder unremarkable XLO=775 cc. \par Prostate V not measured however appears to have intravesical protrusion. \par \par

## 2021-10-14 ENCOUNTER — NON-APPOINTMENT (OUTPATIENT)
Age: 66
End: 2021-10-14

## 2021-10-14 LAB
BACTERIA UR CULT: NORMAL
PSA FREE FLD-MCNC: 31 %
PSA FREE SERPL-MCNC: 0.77 NG/ML
PSA SERPL-MCNC: 2.48 NG/ML

## 2021-10-24 ENCOUNTER — OUTPATIENT (OUTPATIENT)
Dept: OUTPATIENT SERVICES | Facility: HOSPITAL | Age: 66
LOS: 1 days | Discharge: HOME | End: 2021-10-24
Payer: COMMERCIAL

## 2021-10-24 DIAGNOSIS — Z98.890 OTHER SPECIFIED POSTPROCEDURAL STATES: Chronic | ICD-10-CM

## 2021-10-24 DIAGNOSIS — N40.1 BENIGN PROSTATIC HYPERPLASIA WITH LOWER URINARY TRACT SYMPTOMS: ICD-10-CM

## 2021-10-24 DIAGNOSIS — Z96.649 PRESENCE OF UNSPECIFIED ARTIFICIAL HIP JOINT: Chronic | ICD-10-CM

## 2021-10-24 PROCEDURE — 76857 US EXAM PELVIC LIMITED: CPT | Mod: 26

## 2021-10-26 ENCOUNTER — NON-APPOINTMENT (OUTPATIENT)
Age: 66
End: 2021-10-26

## 2021-11-09 ENCOUNTER — APPOINTMENT (OUTPATIENT)
Dept: UROLOGY | Facility: CLINIC | Age: 66
End: 2021-11-09
Payer: COMMERCIAL

## 2021-11-09 VITALS — HEIGHT: 64 IN | BODY MASS INDEX: 27.31 KG/M2 | WEIGHT: 160 LBS

## 2021-11-09 PROCEDURE — 99214 OFFICE O/P EST MOD 30 MIN: CPT

## 2021-11-09 NOTE — HISTORY OF PRESENT ILLNESS
[FreeTextEntry1] : MARILYN MORELOS is a 66 year old male prediabetic, who presents for consultation for BPH/LUTS on rapaflo/finasteride previously seen by outside urologist. Patient has been on finasteride since 2018.\par \par Patient is voiding with frequency/urgency and is interested in perhaps undergoing minimally invasive prostate procedure.\par \par Patient with elevated PSA.\par Most recent PSA 2.48 (4.96, corrected for finasteride)\par \par Bladder ultrasound images visualized from 10/26/2021 demonstrates a 43 g prostate with a prevoid volume of 170 cc and the postvoid volume of 45g,  yielding a PSA density of 0.115, intravesical mild wide protrusion\par \par Previously:\par Denies  PMH including previous kidney stones, recurrent UTIs. \par Family History: No  malignancies. Brother had TURP procedure. \par Social History: School safety. Pt reports he is not consuming bladder irritants. \par \par PSA=3.0  percent free 30 from 12/2020 \par PSA=4.5  11/ 2018 \par PSA=6  10/ 2018 \par \par Prostate TRUS: 45 grams prostate from 2014 \par \par UDS from 2018 demonstrates: normal Bladder compliance, max V=121 cc. Voiding phase, max detrusor pressure 194 cc, Qmax= 8 ml/sec. Tracing were reviewed: there is a sustained detrusor contraction, machine does not appear to be completely calibrated, somewhat difficult to interpret. \par \par RBUS images visualized 7/2020: no hydronephrosis bL no stones BL, bladder unremarkable NIX=085 cc. \par Prostate V not measured however appears to have intravesical protrusion. \par \par

## 2021-11-09 NOTE — ADDENDUM
[FreeTextEntry1] : Agree with the above documentation as outlined by the PA which I have addended as necessary.\par \par

## 2021-11-09 NOTE — ASSESSMENT
[FreeTextEntry1] : MARILYN MORELOS is a 66 year old male prediabetic, who presents for consultation for BPH/LUTS on rapaflo/finasteride previously seen by outside urologist. Patient has been on finasteride since 2018. Bladder ultrasound demonstrates 43 g prostate, most recent PSA 4.96 yielding a PSA density of 0.115\par \par Plan: \par MRI prostate for further evaluation. If prostate MRI is negative consider minimally invasive prostate procedures to help alleviate his urinary symptoms despite Rapaflo/finasteride.

## 2021-12-03 ENCOUNTER — RESULT REVIEW (OUTPATIENT)
Age: 66
End: 2021-12-03

## 2021-12-03 ENCOUNTER — OUTPATIENT (OUTPATIENT)
Dept: OUTPATIENT SERVICES | Facility: HOSPITAL | Age: 66
LOS: 1 days | Discharge: HOME | End: 2021-12-03
Payer: MEDICARE

## 2021-12-03 DIAGNOSIS — Z96.649 PRESENCE OF UNSPECIFIED ARTIFICIAL HIP JOINT: Chronic | ICD-10-CM

## 2021-12-03 DIAGNOSIS — Z98.890 OTHER SPECIFIED POSTPROCEDURAL STATES: Chronic | ICD-10-CM

## 2021-12-03 DIAGNOSIS — R97.20 ELEVATED PROSTATE SPECIFIC ANTIGEN [PSA]: ICD-10-CM

## 2021-12-03 PROCEDURE — 72197 MRI PELVIS W/O & W/DYE: CPT | Mod: 26

## 2021-12-07 ENCOUNTER — NON-APPOINTMENT (OUTPATIENT)
Age: 66
End: 2021-12-07

## 2021-12-14 ENCOUNTER — NON-APPOINTMENT (OUTPATIENT)
Age: 66
End: 2021-12-14

## 2022-01-13 ENCOUNTER — APPOINTMENT (OUTPATIENT)
Dept: UROLOGY | Facility: CLINIC | Age: 67
End: 2022-01-13
Payer: COMMERCIAL

## 2022-01-13 PROCEDURE — 99214 OFFICE O/P EST MOD 30 MIN: CPT | Mod: 95

## 2022-01-13 NOTE — HISTORY OF PRESENT ILLNESS
[Home] : at home, [unfilled] , at the time of the visit. [Medical Office: ([facility])___] : at the medical office located in  [Verbal consent obtained from patient] : the patient, [unfilled] [FreeTextEntry1] : MARILYN MORELOS is a 66 year old male prediabetic, who presents for consultation for BPH/LUTS on rapaflo/finasteride previously seen by outside urologist. Patient has been on finasteride since 2018.\par \par \par Patient with elevated PSA.\par Most recent PSA 2.48 (4.96, corrected for finasteride)\par \par MRI prostate images visualized demonstrating 49 g prostate with mild intravesical protrusion no obvious T2 peripheral zone lesions however significant artifact from hip prosthesis limits evaluation for clinically significant prostate cancer as diffusion-weighted imaging is limited.\par \par Prev\par Bladder ultrasound images visualized from 10/26/2021 demonstrates a 43 g prostate with a prevoid volume of 170 cc and the postvoid volume of 45g,  yielding a PSA density of 0.115, intravesical mild wide protrusion\par \par Previously:\par Denies  PMH including previous kidney stones, recurrent UTIs. \par Family History: No  malignancies. Brother had TURP procedure. \par Social History: School safety. Pt reports he is not consuming bladder irritants. \par \par PSA=3.0  percent free 30 from 12/2020 \par PSA=4.5  11/ 2018 \par PSA=6  10/ 2018 \par \par Prostate TRUS: 45 grams prostate from 2014 \par \par UDS from 2018 demonstrates: normal Bladder compliance, max V=121 cc. Voiding phase, max detrusor pressure 194 cc, Qmax= 8 ml/sec. Tracing were reviewed: there is a sustained detrusor contraction, machine does not appear to be completely calibrated, somewhat difficult to interpret. \par \par RBUS images visualized 7/2020: no hydronephrosis bL no stones BL, bladder unremarkable GRF=627 cc. \par Prostate V not measured however appears to have intravesical protrusion. \par \par

## 2022-01-13 NOTE — ASSESSMENT
[FreeTextEntry1] : MARILYN MORELOS is a 66 year old male prediabetic, who presents for consultation for BPH/LUTS on rapaflo/finasteride previously seen by outside urologist. Patient has been on finasteride since 2018. Most recent PSA 4.96 yielding a PSA density of 0.101.\par \par Although the patient has no obvious risk factors for prostate cancer I am concerned by the increasing PSA on finasteride and that we are unable to fully evaluate with MRI.  Therefore I recommended transperineal prostate biopsy however the patient is hesitating.  He would like to start with repeat PSA.\par He is unable to have a 4K score as he is on finasteride.\par \par Assuming prostate cancer work-up negative he is consider undergoing procedures for his BPH in the future.\par Continue Rapaflo and finasteride for BPH

## 2022-01-13 NOTE — ADDENDUM
[FreeTextEntry1] : The patient-doctor relationship has been established in a face to face fashion via real time video/audio HIPAA compliant communication using telemedicine software. The patient's identity has been confirmed. The patient was previously emailed a copy of the telemedicine consent. They have had a chance to review and has now given verbal consent and has requested care to be assessed and treated through telemedicine and understands there may be limitations in this process and they may need further follow up care in the office and or hospital settings.  We had issues with the video portion of the TeleMed

## 2022-02-08 ENCOUNTER — APPOINTMENT (OUTPATIENT)
Dept: UROLOGY | Facility: CLINIC | Age: 67
End: 2022-02-08
Payer: COMMERCIAL

## 2022-02-08 VITALS — BODY MASS INDEX: 26.98 KG/M2 | WEIGHT: 158 LBS | HEIGHT: 64 IN

## 2022-02-08 PROCEDURE — 99213 OFFICE O/P EST LOW 20 MIN: CPT

## 2022-02-08 NOTE — ASSESSMENT
[FreeTextEntry1] : MARILYN MORELOS is a 66 year old male prediabetic, who presents for consultation for BPH/LUTS on rapaflo/finasteride previously seen by outside urologist. Patient has been on finasteride since 2018. Most recent PSA 4.96 yielding a PSA density of 0.101.\par \par Strongly recommended prostate biopsy however, patient is electing for observation.  Understands the risks involved with this decision occluding the increased risk of morbidity/mortality.\par \par PSA will be drawn today and follow-up in a few weeks for review.\par \par Assuming prostate cancer work-up negative he is consider undergoing procedures for his BPH in the future.\par Continue Rapaflo and finasteride for BPH

## 2022-02-08 NOTE — HISTORY OF PRESENT ILLNESS
[FreeTextEntry1] : MARILYN MORELOS is a 66 year old male prediabetic, who presents for consultation for BPH/LUTS on rapaflo/finasteride previously seen by outside urologist. Patient has been on finasteride since 2018.\par \par At his last visit strongly recommended prostate biopsy however, patient declined and wished for observation only. Risks were reviewed.\par Patient did not yet obtain repeat PSA testing\par Voiding well on Rapaflo/finasteride with no significant change since last visit\par \par Previously\par Most recent PSA 2.48 (4.96, corrected for finasteride)\par \par MRI prostate images visualized demonstrating 49 g prostate with mild intravesical protrusion no obvious T2 peripheral zone lesions however significant artifact from hip prosthesis limits evaluation for clinically significant prostate cancer as diffusion-weighted imaging is limited.\par \par Bladder ultrasound images visualized from 10/26/2021 demonstrates a 43 g prostate with a prevoid volume of 170 cc and the postvoid volume of 45g,  yielding a PSA density of 0.115, intravesical mild wide protrusion\par \par Denies  PMH including previous kidney stones, recurrent UTIs. \par Family History: No  malignancies. Brother had TURP procedure. \par Social History: School safety. Pt reports he is not consuming bladder irritants. \par \par PSA=3.0  percent free 30 from 12/2020 \par PSA=4.5  11/ 2018 \par PSA=6  10/ 2018 \par \par Prostate TRUS: 45 grams prostate from 2014 \par \par UDS from 2018 demonstrates: normal Bladder compliance, max V=121 cc. Voiding phase, max detrusor pressure 194 cc, Qmax= 8 ml/sec. Tracing were reviewed: there is a sustained detrusor contraction, machine does not appear to be completely calibrated, somewhat difficult to interpret. \par \par RBUS images visualized 7/2020: no hydronephrosis bL no stones BL, bladder unremarkable ZJX=275 cc. \par Prostate V not measured however appears to have intravesical protrusion. \par \par

## 2022-02-10 ENCOUNTER — NON-APPOINTMENT (OUTPATIENT)
Age: 67
End: 2022-02-10

## 2022-02-10 LAB
PSA FREE FLD-MCNC: 33 %
PSA FREE SERPL-MCNC: 0.74 NG/ML
PSA SERPL-MCNC: 2.26 NG/ML

## 2022-02-28 ENCOUNTER — APPOINTMENT (OUTPATIENT)
Dept: UROLOGY | Facility: CLINIC | Age: 67
End: 2022-02-28
Payer: COMMERCIAL

## 2022-02-28 PROCEDURE — 99214 OFFICE O/P EST MOD 30 MIN: CPT

## 2022-02-28 NOTE — ASSESSMENT
[FreeTextEntry1] : MARILYN MORELOS is a 67 year old male prediabetic, who presents for consultation for BPH/LUTS, urge incontinence on rapaflo/finasteride previously seen by outside urologist.   Previously did not tolerate Myrbetriq. PSA from 1/2022 on finasteride is 1.7 (x2 = 3.4), percent free 41 %.\par \par Plan\par patient is electing for observation of elev psa, declined pnbx.  Understands the risks involved with this decision.\par Continue Rapaflo and finasteride for BPH. discussed side effects, pt not having any\par We discussed options for lower urinary tract symptoms at this time including surgical management and patient will consider.  Would recommend urodynamics prior\par f/u for Uroflow/pvr

## 2022-06-28 ENCOUNTER — APPOINTMENT (OUTPATIENT)
Dept: UROLOGY | Facility: CLINIC | Age: 67
End: 2022-06-28
Payer: COMMERCIAL

## 2022-06-28 PROCEDURE — 99214 OFFICE O/P EST MOD 30 MIN: CPT

## 2022-06-28 NOTE — ASSESSMENT
[FreeTextEntry1] : MARILYN MORELOS is a 67 year old male prediabetic, who presents for consultation for BPH/LUTS on rapaflo/finasteride previously seen by outside urologist. Patient has been on finasteride since 2018. PSA stable. Worsening LUTS.  Uroflow study demonstrates poor voiding pattern with decreased Q-Blake/average flow and elevated PVR of 197.\par \par Plan\par patient is electing for observation of elev psa. can obtain mri prostate and or bx if continues to rise, he declines for now\par Continue Rapaflo and finasteride for BPH. \par Patient is interested in Rezum procedure and will follow up with Dr. Rodriguez to discuss procedure\par \par Follow-up in 4 months with PSA testing.

## 2022-06-28 NOTE — HISTORY OF PRESENT ILLNESS
[FreeTextEntry1] : MARILYN MORELOS is a 67 year old male prediabetic, who presents for consultation for BPH/LUTS on rapaflo/finasteride previously seen by outside urologist. Patient has been on finasteride since 2018. \par \par Patient is complaining of bothersome lower urinary tract symptoms manifesting as frequency/urgency and multiple episodes of nocturia.  He is managed on maximal medical therapy with Rapaflo and finasteride with worsening symptoms.\par \par Uroflow study tracings visualized today demonstrates prolonged voiding time with plateaued voiding pattern.  Q-Blake/average flow is 11.4/7 with a voided volume of 195 cc and the postvoid residual of 197 cc\par \par Previously:\par PSA=2.26, % free = 33%  from 2/2022 \par PSA= 2.48 percent free 41 % from 10/2021 \par \par Previously: \par MRI prostate images demonstrating 49 g prostate with mild intravesical protrusion no obvious T2 peripheral zone lesions however significant artifact from hip prosthesis limits evaluation for clinically significant prostate cancer as diffusion-weighted imaging is limited.\par \par Bladder ultrasound images from 10/26/2021 demonstrates a 43 g prostate with a prevoid volume of 170 cc and the postvoid volume of 45g,  yielding a PSA density of 0.115, intravesical mild wide protrusion\par \par Denies  PMH including previous kidney stones, recurrent UTIs. \par Family History: No  malignancies. Brother had TURP procedure. \par Social History: School safety. Pt reports he is not consuming bladder irritants. \par \par PSA=3.0  percent free 30 from 12/2020 \par PSA=4.5  11/ 2018 \par PSA=6  10/ 2018 \par \par Prostate TRUS: 45 grams prostate from 2014 \par \par UDS from 2018 demonstrates: normal Bladder compliance, max V=121 cc. Voiding phase, max detrusor pressure 194 cc, Qmax= 8 ml/sec. Tracing were reviewed: there is a sustained detrusor contraction, machine does not appear to be completely calibrated, somewhat difficult to interpret. \par \par RBUS images 7/2020: no hydronephrosis bL no stones BL, bladder unremarkable QMH=520 cc. \par Prostate V not measured however appears to have intravesical protrusion. \par \par

## 2022-07-15 ENCOUNTER — APPOINTMENT (OUTPATIENT)
Dept: UROLOGY | Facility: CLINIC | Age: 67
End: 2022-07-15

## 2022-07-15 PROCEDURE — 99214 OFFICE O/P EST MOD 30 MIN: CPT

## 2022-07-27 NOTE — HISTORY OF PRESENT ILLNESS
[FreeTextEntry1] : 67-year-old male with history of prediabetes.  History of BPH and lower urinary tract symptoms managed on Rapaflo and finasteride.  Presents to office for consultation for REZUM procedure. \par \par Patient states that he is very bothered by urinary symptoms and feels that it is negatively impacting his day-to-day life.  IPSS filled out today reveals he is most bothered by frequency and see rates it a 5 out of 5.  Straining is a 2 out of 5 and the remaining symptoms are 3 out of 5.  Total IPSS 22 out of 35.\par Patient states that he has done his research and has considered invasive prostatic procedures, however he would like to avoid general anesthesia.  Patient is interested in undergoing REZUM. \par \par MRI reveals 49 cc prostate. \par \par US images reviewed. \par \par UDS from 2018 demonstrates: normal Bladder compliance, max V=121 cc. Voiding phase, max detrusor pressure 194 cc, Qmax= 8 ml/sec. Tracing were reviewed: there is a sustained detrusor contraction, machine does not appear to be completely calibrated, somewhat difficult to interpret.

## 2022-07-27 NOTE — ASSESSMENT
[FreeTextEntry1] : REZUM was reviewed with patient in detail.  Patient made aware of risks including bleeding, infection.  Patient also made aware of post procedure expectations including worsening of urinary symptoms for few weeks after procedure.  Patient is aware of the rare risks of urinary retention immediately after procedure requiring indwelling catheter placement.  Patient verbalized understanding of risks and states that he would like to proceed with scheduling procedure.\par \par Also discussed with patient that procedure may not fully improve his urinary symptoms.  Again patient verbalized understanding.\par \par Plan\par -Schedule cystoscopy to assess anatomy\par -UA and U culture 2 weeks prior to cystoscopy

## 2022-08-08 NOTE — H&P PST ADULT - WEIGHT IN LBS
162

## 2022-08-17 ENCOUNTER — APPOINTMENT (OUTPATIENT)
Dept: UROLOGY | Facility: CLINIC | Age: 67
End: 2022-08-17

## 2022-08-17 LAB
BILIRUB UR QL STRIP: NORMAL
COLLECTION METHOD: NORMAL
GLUCOSE UR-MCNC: NORMAL
HCG UR QL: 0.2 EU/DL
HGB UR QL STRIP.AUTO: NORMAL
KETONES UR-MCNC: NORMAL
LEUKOCYTE ESTERASE UR QL STRIP: NORMAL
NITRITE UR QL STRIP: NORMAL
PH UR STRIP: 7
PROT UR STRIP-MCNC: NORMAL
SP GR UR STRIP: 1.01

## 2022-08-17 PROCEDURE — 81003 URINALYSIS AUTO W/O SCOPE: CPT | Mod: QW

## 2022-08-17 PROCEDURE — 99213 OFFICE O/P EST LOW 20 MIN: CPT | Mod: 25

## 2022-08-17 PROCEDURE — 52000 CYSTOURETHROSCOPY: CPT

## 2022-08-17 NOTE — ASSESSMENT
[FreeTextEntry1] : Patient states that he is very bothered by urinary symptoms and feels that it is negatively impacting his day-to-day life. IPSS filled out today reveals he is most bothered by frequency and see rates it a 5 out of 5. Straining is a 2 out of 5 and the remaining symptoms are 3 out of 5. Total IPSS 22 out of 35.\par Patient states that he has done his research and has considered invasive prostatic procedures, however he would like to avoid general anesthesia. Patient is interested in undergoing REZUM. \par \par MRI reveals 49 cc prostate. \par \par US images reviewed. \par \par UDS from 2018 demonstrates: normal Bladder compliance, max V=121 cc. Voiding phase, max detrusor pressure 194 cc, Qmax= 8 ml/sec. Tracing were reviewed: there is a sustained detrusor contraction, machine does not appear to be completely calibrated, somewhat difficult to interpret. \par \par  \par Aug 2022-- UA - neg\par \par cysto today showed trabeculation and trilobar prostate with small amount of protrusion into bladder

## 2022-08-26 NOTE — ASSESSMENT
Patient advised.    [FreeTextEntry1] : As for the patient he is asymptomatic. His examination is unchanged. Catscan of the abdomen is ordered ,for the treatment will be based on CAT scan finding.

## 2022-09-23 ENCOUNTER — APPOINTMENT (OUTPATIENT)
Dept: UROLOGY | Facility: CLINIC | Age: 67
End: 2022-09-23

## 2022-09-23 LAB — BACTERIA UR CULT: ABNORMAL

## 2022-09-23 PROCEDURE — 53854Z: CUSTOM

## 2022-09-26 ENCOUNTER — APPOINTMENT (OUTPATIENT)
Dept: UROLOGY | Facility: CLINIC | Age: 67
End: 2022-09-26

## 2022-09-26 ENCOUNTER — EMERGENCY (EMERGENCY)
Facility: HOSPITAL | Age: 67
LOS: 0 days | Discharge: HOME | End: 2022-09-27
Attending: EMERGENCY MEDICINE | Admitting: EMERGENCY MEDICINE

## 2022-09-26 VITALS
WEIGHT: 160.06 LBS | DIASTOLIC BLOOD PRESSURE: 90 MMHG | TEMPERATURE: 98 F | HEART RATE: 130 BPM | SYSTOLIC BLOOD PRESSURE: 138 MMHG | HEIGHT: 66 IN | OXYGEN SATURATION: 100 % | RESPIRATION RATE: 20 BRPM

## 2022-09-26 VITALS
DIASTOLIC BLOOD PRESSURE: 85 MMHG | RESPIRATION RATE: 16 BRPM | BODY MASS INDEX: 26.98 KG/M2 | WEIGHT: 158 LBS | SYSTOLIC BLOOD PRESSURE: 128 MMHG | HEIGHT: 64 IN | HEART RATE: 103 BPM

## 2022-09-26 VITALS
SYSTOLIC BLOOD PRESSURE: 117 MMHG | RESPIRATION RATE: 20 BRPM | DIASTOLIC BLOOD PRESSURE: 74 MMHG | OXYGEN SATURATION: 97 % | HEART RATE: 98 BPM

## 2022-09-26 DIAGNOSIS — R33.8 OTHER RETENTION OF URINE: ICD-10-CM

## 2022-09-26 DIAGNOSIS — I10 ESSENTIAL (PRIMARY) HYPERTENSION: ICD-10-CM

## 2022-09-26 DIAGNOSIS — K21.9 GASTRO-ESOPHAGEAL REFLUX DISEASE WITHOUT ESOPHAGITIS: ICD-10-CM

## 2022-09-26 DIAGNOSIS — E11.51 TYPE 2 DIABETES MELLITUS WITH DIABETIC PERIPHERAL ANGIOPATHY WITHOUT GANGRENE: ICD-10-CM

## 2022-09-26 DIAGNOSIS — E78.00 PURE HYPERCHOLESTEROLEMIA, UNSPECIFIED: ICD-10-CM

## 2022-09-26 DIAGNOSIS — I34.0 NONRHEUMATIC MITRAL (VALVE) INSUFFICIENCY: ICD-10-CM

## 2022-09-26 DIAGNOSIS — Z79.84 LONG TERM (CURRENT) USE OF ORAL HYPOGLYCEMIC DRUGS: ICD-10-CM

## 2022-09-26 DIAGNOSIS — N40.1 BENIGN PROSTATIC HYPERPLASIA WITH LOWER URINARY TRACT SYMPTOMS: ICD-10-CM

## 2022-09-26 DIAGNOSIS — M19.90 UNSPECIFIED OSTEOARTHRITIS, UNSPECIFIED SITE: ICD-10-CM

## 2022-09-26 DIAGNOSIS — E11.9 TYPE 2 DIABETES MELLITUS WITHOUT COMPLICATIONS: ICD-10-CM

## 2022-09-26 DIAGNOSIS — R33.9 RETENTION OF URINE, UNSPECIFIED: ICD-10-CM

## 2022-09-26 DIAGNOSIS — Z96.649 PRESENCE OF UNSPECIFIED ARTIFICIAL HIP JOINT: Chronic | ICD-10-CM

## 2022-09-26 DIAGNOSIS — Z96.642 PRESENCE OF LEFT ARTIFICIAL HIP JOINT: ICD-10-CM

## 2022-09-26 DIAGNOSIS — Z98.890 OTHER SPECIFIED POSTPROCEDURAL STATES: Chronic | ICD-10-CM

## 2022-09-26 PROCEDURE — 51702 INSERT TEMP BLADDER CATH: CPT

## 2022-09-26 PROCEDURE — 99284 EMERGENCY DEPT VISIT MOD MDM: CPT | Mod: 25

## 2022-09-26 PROCEDURE — 99024 POSTOP FOLLOW-UP VISIT: CPT

## 2022-09-26 NOTE — ED PROVIDER NOTE - PATIENT PORTAL LINK FT
You can access the FollowMyHealth Patient Portal offered by [facility] by registering at the following website: [email or web address]. By joining [employer]’s FollowMyHealth portal, you will also be able to view your health information using other applications (apps) compatible with our system.

## 2022-09-26 NOTE — ED PROVIDER NOTE - OBJECTIVE STATEMENT
67 year old male status post Rezum procedure of the prostate done on friday. patient had cath in and was removed today and comes to emergency room because he has not urinated since cath was out. no fever/chills.

## 2022-09-26 NOTE — ED PROVIDER NOTE - CARE PROVIDER_API CALL
Trenton Baron  Urology  [address], [address]  [place] [address]  Phone: [phone]  Fax: [phone]  Follow Up Time:

## 2022-09-26 NOTE — ED PROCEDURE NOTE - NS ED ATTENDING STATEMENT MOD
This was a shared visit with the KRISHNA. I reviewed and verified the documentation and independently performed the documented:

## 2022-09-26 NOTE — ED PROVIDER NOTE - NSFOLLOWUPINSTRUCTIONS_ED_ALL_ED_FT
Follow up with your primary care doctor and your urologist in 1-2 days     Urinary Retention    Acute urinary retention is the temporary inability to urinate. This is a common problem in older men. As men age their prostates become larger and block the flow of urine from the bladder. If you are sent home with a man catheter and a drainage system make sure to keep the drainage bag emptied and lower than your catheter. Keep the man catheter in until you follow up with a urologist.    There are two main types of drainage bags. One is a large bag that usually is used at night. It has a good capacity that will allow you to sleep through the night without having to empty it. The second type is called a leg bag. It has a smaller capacity, so it needs to be emptied more frequently. However, the main advantage is that it can be attached by a leg strap and can go underneath your clothing, allowing you the freedom to move about or leave your home.     SEEK IMMEDIATE MEDICAL CARE IF YOU DEVELOP THE FOLLOWING SYMPTOMS: the catheter stops draining urine, the catheter falls out, abdominal pain, nausea/vomiting, or chills/fever.        Man Catheter Placement and Care    WHAT YOU NEED TO KNOW:    What is a Man catheter? A Man catheter is a sterile tube that is inserted into your bladder to drain urine. It is also called an indwelling urinary catheter. The tip of the catheter has a small balloon filled with solution that holds the catheter in your bladder.               How is a Man catheter placed?     Your healthcare provider will clean your genital area with a sterile solution. He or she will put lubricant jelly on the catheter to help it go in smoothly. The end of the catheter with the deflated balloon will be inserted into your urethra. The catheter will be moved slowly and gently into your bladder. You may be asked to take slow, deep breaths or to push as if you were trying to urinate as the catheter is inserted.      When your healthcare provider sees urine flowing from the catheter, he or she will fill the balloon at the end of the catheter. The balloon holds the catheter in place so it does not come out. Your healthcare provider will attach the open end of the catheter to a sterile drainage bag.    How do I care for my Man catheter?     Clean your genital area 2 times every day. Clean your catheter and the area around where it was inserted. Use soap and water. Clean your anal opening and catheter area after every bowel movement.       Secure the catheter tube so you do not pull or move the catheter. This helps prevent pain and bladder spasms. Healthcare providers will show you how to use medical tape or a strap to secure the catheter tube to your body.       Keep a closed drainage system. Your Man catheter should always be attached to the drainage bag to form a closed system. Do not disconnect any part of the closed system unless you need to change the bag.    How do I care for my drainage bag?     Ask if a leg bag is right for you. A leg bag can be worn under your clothes. Ask your healthcare provider for more information about a leg bag.       Keep the drainage bag below the level of your waist. This helps stop urine from moving back up the tubing and into your bladder. Do not loop or kink the tubing. This can cause urine to back up and collect in your bladder. Do not let the drainage bag touch or lie on the floor.      Empty the drainage bag when needed. The weight of a full drainage bag can be painful. Empty the drainage bag every 3 to 6 hours or when it is ? full.       Clean and change the drainage bag as directed. Ask your healthcare provider how often you should change the drainage bag and what cleaning solution to use. Wear disposable gloves when you change the bag. Do not allow the end of the catheter or tubing to touch anything. Clean the ends with an alcohol pad before you reconnect them.    What can I do if problems develop?    No urine is draining into the bag:   Check for kinks in the tubing and straighten them out.       Check the tape or strap used to secure the catheter tube to your skin. Make sure it is not blocking the tube.       Make sure you are not sitting or lying on the tubing.      Make sure the urine bag is hanging below the level of your waist.      Urine leaks from or around the catheter, tubing, or drainage bag: Check if the closed drainage system has accidently come open or apart. Clean the catheter and tubing ends with a new alcohol pad and reconnect them.     What are the risks of a Man catheter? You may develop an infection caused by bacteria. This can happen when the drainage system is opened and bacteria get inside the tubing. You can also get an infection if the catheter equipment is not cleaned well or you do not wash your hands. The infection can spread to your bladder or other organs, and may become severe.    How can I help prevent an infection?     Wash your hands often. Wash before and after you touch your catheter, tubing, or drainage bag. Use soap and water. Wear clean disposable gloves when you care for your catheter or disconnect the drainage bag. Wash your hands before you prepare or eat food. Handwashing           Drink liquids as directed. Ask your healthcare provider how much liquid to drink each day and which liquids are best for you. Liquids will help flush your kidneys and bladder to help prevent infection.    When should I seek immediate care?     Your catheter comes out.       You suddenly have material that looks like sand in the tubing or drainage bag.      No urine is draining into the bag and you have checked the system.      You have pain in your hip, back, pelvis, or lower abdomen.      You are confused or cannot think clearly.    When should I contact my healthcare provider?     You have a fever.      You have bladder spasms for more than 1 day after the catheter is placed.      You see blood in the tubing or drainage bag.      You have a rash or itching where the catheter tube is secured to your skin.      Urine leaks from or around the catheter, tubing, or drainage bag.      The closed drainage system has accidently come open or apart.       You see a layer of crystals inside the tubing.      You have questions or concerns about your condition or care.    CARE AGREEMENT:    You have the right to help plan your care. Learn about your health condition and how it may be treated. Discuss treatment options with your healthcare providers to decide what care you want to receive. You always have the right to refuse treatment.        © Copyright [employer] 2019 All illustrations and images included in CareNotes are the copyrighted property of [employer].A[employer]., Inc[employer] or [employer].

## 2022-09-26 NOTE — ED PROVIDER NOTE - PHYSICAL EXAMINATION
Physical Exam    Vital Signs: I have reviewed the initial vital signs.  Constitutional: well-nourished, appears stated age, no acute distress  Eyes: Conjunctiva pink, Sclera clear, PERRLA, EOMI.  Cardiovascular: S1 and S2, regular rate, regular rhythm, well-perfused extremities, radial pulses equal and 2+  Respiratory: unlabored respiratory effort, clear to auscultation bilaterally no wheezing, rales and rhonchi  Gastrointestinal: soft, non-tender abdomen, no pulsatile mass, normal bowl sounds +distended bladder  Musculoskeletal: supple neck, no lower extremity edema, no midline tenderness  Integumentary: warm, dry, no rash  Neurologic: awake, alert, cranial nerves II-XII grossly intact, extremities’ motor and sensory functions grossly intact  Psychiatric: appropriate mood, appropriate affect

## 2022-09-26 NOTE — ED PROVIDER NOTE - CLINICAL SUMMARY MEDICAL DECISION MAKING FREE TEXT BOX
67yM pw urinary retention after man removed this morning by urology Dr martinez - Man was placed Friday after having Rezum procedure.  No fever chills back pain no fever . 14F  man placed in ED + large UO  Patient to be discharged from ED in well appearing condition. =.  Verbal instructions given, including instructions to return to ED immediately for any new, worsening, or concerning symptoms. Limitations of ED work up discussed.  Patient reports understanding of above with capacity and insight. Written discharge instructions additionally given, including follow-up plan.

## 2022-09-27 ENCOUNTER — APPOINTMENT (OUTPATIENT)
Dept: UROLOGY | Facility: CLINIC | Age: 67
End: 2022-09-27

## 2022-09-27 VITALS — WEIGHT: 158 LBS | BODY MASS INDEX: 26.98 KG/M2 | HEIGHT: 64 IN

## 2022-09-27 PROCEDURE — 99024 POSTOP FOLLOW-UP VISIT: CPT

## 2022-09-27 NOTE — END OF VISIT
[FreeTextEntry3] : I was immediately available and agree with the above transcription by the physicians assistant

## 2022-09-27 NOTE — HISTORY OF PRESENT ILLNESS
[FreeTextEntry1] : 67-year-old male status post REZUM.  Mehta catheter was removed yesterday for trial of void.  Patient presented to emergency room last night for urinary retention and had a 14 Kazakh catheter placed and approximately 600 cc of urine output.  Patient presents to office for evaluation.  He reports feeling well.  He states that prior to going to the emergency room have a Mehta catheter placed he had a lot of pain.  Patient denies any fevers.  He denies any bleeding in the catheter bag.  Patient is currently taking silodosin and finasteride daily.

## 2022-09-27 NOTE — PHYSICAL EXAM
[General Appearance - In No Acute Distress] : no acute distress [] : no respiratory distress [Oriented To Time, Place, And Person] : oriented to person, place, and time [Normal Station and Gait] : the gait and station were normal for the patient's age [FreeTextEntry1] : 14 South [place] Mehta catheter in place.  Clear straw-colored urine in catheter bag.  No blood clots visualized.

## 2022-09-27 NOTE — ASSESSMENT
[FreeTextEntry1] : 67-year-old status post REZUM with Dr. Rodriguez. \par Failed trial of void yesterday.  Mehta catheter was replaced by emergency department.\par Patient is currently feeling well.  Denies any fevers.\par \par Plan\par -Mehta catheter was secured with cath lock.\par -Patient to follow-up tomorrow with Dr. Rodriguez.  Appointment made early in the morning in case patient to have another trial of void he can present to office in the afternoon for bladder scan.\par Patient agreeable with plan.\par \par Emergency Precautions were reviewed with patient.

## 2022-09-28 ENCOUNTER — NON-APPOINTMENT (OUTPATIENT)
Age: 67
End: 2022-09-28

## 2022-09-28 ENCOUNTER — APPOINTMENT (OUTPATIENT)
Dept: UROLOGY | Facility: CLINIC | Age: 67
End: 2022-09-28

## 2022-09-28 VITALS
TEMPERATURE: 98 F | HEIGHT: 64 IN | HEART RATE: 117 BPM | DIASTOLIC BLOOD PRESSURE: 89 MMHG | WEIGHT: 158 LBS | BODY MASS INDEX: 26.98 KG/M2 | SYSTOLIC BLOOD PRESSURE: 136 MMHG | RESPIRATION RATE: 16 BRPM

## 2022-09-28 PROCEDURE — 99024 POSTOP FOLLOW-UP VISIT: CPT

## 2022-09-30 ENCOUNTER — APPOINTMENT (OUTPATIENT)
Dept: UROLOGY | Facility: CLINIC | Age: 67
End: 2022-09-30

## 2022-09-30 PROCEDURE — 99212 OFFICE O/P EST SF 10 MIN: CPT | Mod: 24

## 2022-09-30 NOTE — ADDENDUM
[FreeTextEntry1] : came back without being able to void\par 14F placed back\par will start methylprednisolone taper dose\par \par follow up in one week for trial of void

## 2022-09-30 NOTE — ASSESSMENT
[FreeTextEntry1] : pt with rezum last week\par man was out on monday \par developed urinary retention --- man replaced on wednesday\par \par presents for another trial of void

## 2022-10-04 ENCOUNTER — APPOINTMENT (OUTPATIENT)
Dept: UROLOGY | Facility: CLINIC | Age: 67
End: 2022-10-04

## 2022-10-04 PROCEDURE — 51700 IRRIGATION OF BLADDER: CPT | Mod: 78

## 2022-10-04 PROCEDURE — A4217: CPT | Mod: NC

## 2022-10-07 ENCOUNTER — APPOINTMENT (OUTPATIENT)
Dept: UROLOGY | Facility: CLINIC | Age: 67
End: 2022-10-07

## 2022-10-07 PROCEDURE — 99024 POSTOP FOLLOW-UP VISIT: CPT

## 2022-10-17 ENCOUNTER — APPOINTMENT (OUTPATIENT)
Dept: UROLOGY | Facility: CLINIC | Age: 67
End: 2022-10-17

## 2022-10-17 VITALS
SYSTOLIC BLOOD PRESSURE: 140 MMHG | BODY MASS INDEX: 26.63 KG/M2 | RESPIRATION RATE: 14 BRPM | TEMPERATURE: 98 F | DIASTOLIC BLOOD PRESSURE: 90 MMHG | HEART RATE: 110 BPM | HEIGHT: 64 IN | WEIGHT: 156 LBS

## 2022-10-17 PROCEDURE — 99024 POSTOP FOLLOW-UP VISIT: CPT

## 2022-10-17 NOTE — ASSESSMENT
[FreeTextEntry1] : MARILYN MORELOS is a 67 year old male prediabetic, who presents for consultation for BPH/LUTS on rapaflo/finasteride previously seen by outside urologist. Patient has been on finasteride since 2018. PSA stable. Worsening LUTS.  Uroflow study demonstrates poor voiding pattern with decreased Q-Blake/average flow and elevated PVR of 197.  He status post Rezum procedure on 9/23/2022 has been in urinary retention since.\par \par Plan\par patient is electing for observation of elev psa. can obtain mri prostate and or bx if continues to rise, he declines for now until his urinary retention has been resolved.  Follow-up with Dr. Rodriguez this Wednesday for cystoscopy and further evaluation\par Continue Rapaflo and finasteride for BPH. \par Follow-up in 4 months with PSA testing.

## 2022-10-17 NOTE — HISTORY OF PRESENT ILLNESS
[FreeTextEntry1] : MARILYN MORELOS is a 67 year old male prediabetic, who presents for consultation for BPH/LUTS on rapaflo/finasteride previously seen by outside urologist. Patient has been on finasteride since 2018. PSA stable. Worsening LUTS.  Uroflow study demonstrates poor voiding pattern with decreased Q-Blake/average flow and elevated PVR of 197.  He status post rezum procedure on 9/23/2022 has been in urinary retention since.\par \par He scheduled for cystoscopy with Dr. Rodriguez this Wednesday.  He presents today as he is having difficulty with his catheter bag.  He was supposed to obtain PSA prior to this appointment however, he still having urinary retention and is holding off until this is resolved.\par \par Previously:\par Uroflow study tracings visualized today demonstrates prolonged voiding time with plateaued voiding pattern.  Q-Blake/average flow is 11.4/7 with a voided volume of 195 cc and the postvoid residual of 197 cc\par \par Previously:\par PSA=2.26, % free = 33%  from 2/2022 \par PSA= 2.48 percent free 41 % from 10/2021 \par \par Previously: \par MRI prostate images demonstrating 49 g prostate with mild intravesical protrusion no obvious T2 peripheral zone lesions however significant artifact from hip prosthesis limits evaluation for clinically significant prostate cancer as diffusion-weighted imaging is limited.\par \par Bladder ultrasound images from 10/26/2021 demonstrates a 43 g prostate with a prevoid volume of 170 cc and the postvoid volume of 45g,  yielding a PSA density of 0.115, intravesical mild wide protrusion\par \par Denies  PMH including previous kidney stones, recurrent UTIs. \par Family History: No  malignancies. Brother had TURP procedure. \par Social History: School safety. Pt reports he is not consuming bladder irritants. \par \par PSA=3.0  percent free 30 from 12/2020 \par PSA=4.5  11/ 2018 \par PSA=6  10/ 2018 \par \par Prostate TRUS: 45 grams prostate from 2014 \par \par UDS from 2018 demonstrates: normal Bladder compliance, max V=121 cc. Voiding phase, max detrusor pressure 194 cc, Qmax= 8 ml/sec. Tracing were reviewed: there is a sustained detrusor contraction, machine does not appear to be completely calibrated, somewhat difficult to interpret. \par \par RBUS images 7/2020: no hydronephrosis bL no stones BL, bladder unremarkable ONE=952 cc. \par Prostate V not measured however appears to have intravesical protrusion. \par \par

## 2022-10-17 NOTE — PHYSICAL EXAM
[General Appearance - Well Developed] : well developed [General Appearance - Well Nourished] : well nourished [Normal Appearance] : normal appearance [Well Groomed] : well groomed [General Appearance - In No Acute Distress] : no acute distress [Abdomen Soft] : soft [Abdomen Tenderness] : non-tender [Costovertebral Angle Tenderness] : no ~M costovertebral angle tenderness [Urinary Bladder Findings] : the bladder was normal on palpation [] : no respiratory distress [Respiration, Rhythm And Depth] : normal respiratory rhythm and effort [Exaggerated Use Of Accessory Muscles For Inspiration] : no accessory muscle use [Oriented To Time, Place, And Person] : oriented to person, place, and time [Affect] : the affect was normal [Mood] : the mood was normal [Not Anxious] : not anxious [Normal Station and Gait] : the gait and station were normal for the patient's age [No Focal Deficits] : no focal deficits [FreeTextEntry1] : 14 Malagasy catheter draining clear urine

## 2022-10-19 ENCOUNTER — APPOINTMENT (OUTPATIENT)
Dept: UROLOGY | Facility: CLINIC | Age: 67
End: 2022-10-19

## 2022-10-19 PROCEDURE — 99024 POSTOP FOLLOW-UP VISIT: CPT

## 2022-10-19 PROCEDURE — 52000 CYSTOURETHROSCOPY: CPT | Mod: 78

## 2022-10-19 NOTE — ASSESSMENT
[FreeTextEntry1] : 67-year-old status post REZUM. \par He has failed to trial of voids and developed urinary retention. Last week he was started on methylprednisolone taper. He presents to office today for trial of void. Catheter bag has clear yellow urine in it. No blood in catheter bag. Patient denies any fevers. \par \par cysto today did show possible ball valving from necrotic prostate tissue\par trial of void performed again -- aware that if he fails again will recommend TURP\par \par pvr after removal of catheter today with pvr of 230ml

## 2022-10-24 NOTE — HISTORY OF PRESENT ILLNESS
[FreeTextEntry1] : 67-year-old status post REZUM. \par He has failed to trial of voids and developed urinary retention.  Last week he was started on methylprednisolone taper.  He presents to office today for trial of void.  Catheter bag has clear yellow urine in it.  No blood in catheter bag.  Patient denies any fevers.

## 2022-10-24 NOTE — PHYSICAL EXAM
[General Appearance - In No Acute Distress] : no acute distress [] : no respiratory distress [Oriented To Time, Place, And Person] : oriented to person, place, and time [Normal Station and Gait] : the gait and station were normal for the patient's age [FreeTextEntry1] : 14 [place] catheter in place.

## 2022-10-24 NOTE — HISTORY OF PRESENT ILLNESS
[FreeTextEntry1] : Cristian is a 67-year-old male status post REZUM.  Presents to office today for Mehta catheter removal.  Patient states that he has been uncomfortable with the catheter.  He denies any severe pains.  He denies any fevers.  He does report abnormal bowel movements.\par \par Patient has history of bothersome urinary symptoms and feel they were negatively impacting his day-to-day life.  His IPSS prior to procedure was 22 out of 35 in which she rated frequency of 5 out of 5, straining to 2 out of 5, and remaining symptoms were all 3 out of 5.\par \par MRI reveals a 49 cc prostate and passed.\par \par Urodynamics in 2018 revealed normal Bladder compliance, max V=121 cc. Voiding phase, max detrusor pressure 194 cc, Qmax= 8 ml/sec. Tracing were reviewed: there is a sustained detrusor contraction, machine does not appear to be completely calibrated, somewhat difficult to interpret. \par \par cysto showed showed trabeculation and trilobar prostate with small amount of protrusion into bladder \par \par PSA 10/2021- 2.48 ng/mL with 31 % free. \par

## 2022-10-24 NOTE — ASSESSMENT
[FreeTextEntry1] : Bedside cystometrogram today revealed excellent bladder contraction.  Over 20 cm of H2O.\par Patient did have a sensory urgency is only 90 mL of sterile water was drained in the bladder.\par \par Patient was allowed to drain and 100 mL of sterile water was slowly irrigated into the bladder.  Mehta catheter was removed.  Patient was able to urinate all 100 mL of sterile water with a solid, weak, urinary stream.\par \par Plan\par -Emergency precautions reviewed.  Patient to return to office this afternoon if unable to urinate.\par -Follow-up in 1 week to reassess

## 2022-10-24 NOTE — ASSESSMENT
[FreeTextEntry1] : Cristian is a 67-year-old male status post REZUM.  Presents to office today 09/26/2022 for Mehta catheter removal.  Patient states that he has been uncomfortable with the catheter.  He denies any severe pains.  He denies any fevers.  He does report abnormal bowel movements.\par \par Patient has history of bothersome urinary symptoms and feel they were negatively impacting his day-to-day life.  His IPSS prior to procedure was 22 out of 35 in which she rated frequency of 5 out of 5, straining to 2 out of 5, and remaining symptoms were all 3 out of 5.\par \par MRI reveals a 49 cc prostate and passed.\par \par Urodynamics in 2018 revealed normal Bladder compliance, max V=121 cc. Voiding phase, max detrusor pressure 194 cc, Qmax= 8 ml/sec. Tracing were reviewed: there is a sustained detrusor contraction, machine does not appear to be completely calibrated, somewhat difficult to interpret. \par \par cysto showed showed trabeculation and trilobar prostate with small amount of protrusion into bladder \par \par PSA 10/2021- 2.48 ng/mL with 31 % free. \par

## 2022-10-26 ENCOUNTER — APPOINTMENT (OUTPATIENT)
Dept: UROLOGY | Facility: CLINIC | Age: 67
End: 2022-10-26

## 2022-10-26 PROCEDURE — 99024 POSTOP FOLLOW-UP VISIT: CPT

## 2022-11-01 ENCOUNTER — OUTPATIENT (OUTPATIENT)
Dept: OUTPATIENT SERVICES | Facility: HOSPITAL | Age: 67
LOS: 1 days | Discharge: HOME | End: 2022-11-01

## 2022-11-01 ENCOUNTER — RESULT REVIEW (OUTPATIENT)
Age: 67
End: 2022-11-01

## 2022-11-01 VITALS
SYSTOLIC BLOOD PRESSURE: 117 MMHG | TEMPERATURE: 97 F | RESPIRATION RATE: 18 BRPM | HEART RATE: 105 BPM | WEIGHT: 160.94 LBS | DIASTOLIC BLOOD PRESSURE: 81 MMHG | HEIGHT: 63 IN | OXYGEN SATURATION: 96 %

## 2022-11-01 DIAGNOSIS — Z01.818 ENCOUNTER FOR OTHER PREPROCEDURAL EXAMINATION: ICD-10-CM

## 2022-11-01 DIAGNOSIS — N40.1 BENIGN PROSTATIC HYPERPLASIA WITH LOWER URINARY TRACT SYMPTOMS: ICD-10-CM

## 2022-11-01 DIAGNOSIS — Z96.649 PRESENCE OF UNSPECIFIED ARTIFICIAL HIP JOINT: Chronic | ICD-10-CM

## 2022-11-01 DIAGNOSIS — Z87.19 PERSONAL HISTORY OF OTHER DISEASES OF THE DIGESTIVE SYSTEM: Chronic | ICD-10-CM

## 2022-11-01 DIAGNOSIS — Z98.890 OTHER SPECIFIED POSTPROCEDURAL STATES: Chronic | ICD-10-CM

## 2022-11-01 LAB
A1C WITH ESTIMATED AVERAGE GLUCOSE RESULT: 7.1 % — HIGH (ref 4–5.6)
ALBUMIN SERPL ELPH-MCNC: 4.6 G/DL — SIGNIFICANT CHANGE UP (ref 3.5–5.2)
ALP SERPL-CCNC: 118 U/L — HIGH (ref 30–115)
ALT FLD-CCNC: 14 U/L — SIGNIFICANT CHANGE UP (ref 0–41)
ANION GAP SERPL CALC-SCNC: 16 MMOL/L — HIGH (ref 7–14)
APTT BLD: 30.2 SEC — SIGNIFICANT CHANGE UP (ref 27–39.2)
AST SERPL-CCNC: 15 U/L — SIGNIFICANT CHANGE UP (ref 0–41)
BASOPHILS # BLD AUTO: 0.06 K/UL — SIGNIFICANT CHANGE UP (ref 0–0.2)
BASOPHILS NFR BLD AUTO: 0.6 % — SIGNIFICANT CHANGE UP (ref 0–1)
BILIRUB SERPL-MCNC: 0.8 MG/DL — SIGNIFICANT CHANGE UP (ref 0.2–1.2)
BUN SERPL-MCNC: 20 MG/DL — SIGNIFICANT CHANGE UP (ref 10–20)
CALCIUM SERPL-MCNC: 9.5 MG/DL — SIGNIFICANT CHANGE UP (ref 8.4–10.5)
CHLORIDE SERPL-SCNC: 98 MMOL/L — SIGNIFICANT CHANGE UP (ref 98–110)
CO2 SERPL-SCNC: 23 MMOL/L — SIGNIFICANT CHANGE UP (ref 17–32)
CREAT SERPL-MCNC: 1.4 MG/DL — SIGNIFICANT CHANGE UP (ref 0.7–1.5)
EGFR: 56 ML/MIN/1.73M2 — LOW
EOSINOPHIL # BLD AUTO: 0.13 K/UL — SIGNIFICANT CHANGE UP (ref 0–0.7)
EOSINOPHIL NFR BLD AUTO: 1.2 % — SIGNIFICANT CHANGE UP (ref 0–8)
ESTIMATED AVERAGE GLUCOSE: 157 MG/DL — HIGH (ref 68–114)
GLUCOSE SERPL-MCNC: 318 MG/DL — HIGH (ref 70–99)
HCT VFR BLD CALC: 41.8 % — LOW (ref 42–52)
HGB BLD-MCNC: 14.6 G/DL — SIGNIFICANT CHANGE UP (ref 14–18)
IMM GRANULOCYTES NFR BLD AUTO: 0.5 % — HIGH (ref 0.1–0.3)
INR BLD: 0.91 RATIO — SIGNIFICANT CHANGE UP (ref 0.65–1.3)
LYMPHOCYTES # BLD AUTO: 1.47 K/UL — SIGNIFICANT CHANGE UP (ref 1.2–3.4)
LYMPHOCYTES # BLD AUTO: 14.1 % — LOW (ref 20.5–51.1)
MCHC RBC-ENTMCNC: 31.7 PG — HIGH (ref 27–31)
MCHC RBC-ENTMCNC: 34.9 G/DL — SIGNIFICANT CHANGE UP (ref 32–37)
MCV RBC AUTO: 90.9 FL — SIGNIFICANT CHANGE UP (ref 80–94)
MONOCYTES # BLD AUTO: 0.72 K/UL — HIGH (ref 0.1–0.6)
MONOCYTES NFR BLD AUTO: 6.9 % — SIGNIFICANT CHANGE UP (ref 1.7–9.3)
NEUTROPHILS # BLD AUTO: 8 K/UL — HIGH (ref 1.4–6.5)
NEUTROPHILS NFR BLD AUTO: 76.7 % — HIGH (ref 42.2–75.2)
NRBC # BLD: 0 /100 WBCS — SIGNIFICANT CHANGE UP (ref 0–0)
PLATELET # BLD AUTO: 211 K/UL — SIGNIFICANT CHANGE UP (ref 130–400)
POTASSIUM SERPL-MCNC: 4.7 MMOL/L — SIGNIFICANT CHANGE UP (ref 3.5–5)
POTASSIUM SERPL-SCNC: 4.7 MMOL/L — SIGNIFICANT CHANGE UP (ref 3.5–5)
PROT SERPL-MCNC: 7.7 G/DL — SIGNIFICANT CHANGE UP (ref 6–8)
PROTHROM AB SERPL-ACNC: 10.4 SEC — SIGNIFICANT CHANGE UP (ref 9.95–12.87)
RBC # BLD: 4.6 M/UL — LOW (ref 4.7–6.1)
RBC # FLD: 12.4 % — SIGNIFICANT CHANGE UP (ref 11.5–14.5)
SODIUM SERPL-SCNC: 137 MMOL/L — SIGNIFICANT CHANGE UP (ref 135–146)
WBC # BLD: 10.43 K/UL — SIGNIFICANT CHANGE UP (ref 4.8–10.8)
WBC # FLD AUTO: 10.43 K/UL — SIGNIFICANT CHANGE UP (ref 4.8–10.8)

## 2022-11-01 PROCEDURE — 71046 X-RAY EXAM CHEST 2 VIEWS: CPT | Mod: 26

## 2022-11-01 PROCEDURE — 93010 ELECTROCARDIOGRAM REPORT: CPT

## 2022-11-01 RX ORDER — FINASTERIDE 5 MG/1
1 TABLET, FILM COATED ORAL
Qty: 0 | Refills: 0 | DISCHARGE

## 2022-11-01 RX ORDER — POLYMYXIN B SULF/TRIMETHOPRIM 10000-1/ML
1 DROPS OPHTHALMIC (EYE)
Qty: 0 | Refills: 0 | DISCHARGE

## 2022-11-01 RX ORDER — SILODOSIN 4 MG/1
1 CAPSULE ORAL
Qty: 0 | Refills: 0 | DISCHARGE

## 2022-11-01 RX ORDER — NIFEDIPINE 30 MG
1 TABLET, EXTENDED RELEASE 24 HR ORAL
Qty: 0 | Refills: 0 | DISCHARGE

## 2022-11-01 RX ORDER — METOPROLOL TARTRATE 50 MG
0 TABLET ORAL
Qty: 0 | Refills: 0 | DISCHARGE

## 2022-11-01 RX ORDER — METFORMIN HYDROCHLORIDE 850 MG/1
1 TABLET ORAL
Qty: 0 | Refills: 0 | DISCHARGE

## 2022-11-01 RX ORDER — ATORVASTATIN CALCIUM 80 MG/1
1 TABLET, FILM COATED ORAL
Qty: 0 | Refills: 0 | DISCHARGE

## 2022-11-01 RX ORDER — SACUBITRIL AND VALSARTAN 24; 26 MG/1; MG/1
1 TABLET, FILM COATED ORAL
Qty: 0 | Refills: 0 | DISCHARGE

## 2022-11-01 RX ORDER — PANTOPRAZOLE SODIUM 20 MG/1
1 TABLET, DELAYED RELEASE ORAL
Qty: 0 | Refills: 0 | DISCHARGE

## 2022-11-01 NOTE — H&P PST ADULT - FUNCTIONAL ASSESSMENT - BASIC MOBILITY 6.
4-calculated by average /Not able to assess (calculate score using [facility] averaging method)

## 2022-11-01 NOTE — H&P PST ADULT - NSICDXPASTSURGICALHX_GEN_ALL_CORE_FT
PAST SURGICAL HISTORY:  H/O hernia repair x 2    History of hip replacement, total left-2005    History of umbilical hernia

## 2022-11-01 NOTE — H&P PST ADULT - HISTORY OF PRESENT ILLNESS
Patient is S/P REZUM procedure who had Mehta's catheter   Patient denies any cp, sob, palpitations, fever, cough, URI, abdominal pains, N/V, UTI, Rashes or open wounds.  As per patient exercise tolerance of 1-2 fos walks with out sob  Patient denies any s/s covid 19 and reports no contact with known positive people. Patient has appointment for repeat covid testing pre op and instructed to continue to self monitor and report any concerns to MD. Pt will continue to practice self isolation and  exposure control measures pre op  Anesthesia Alert  NO--Difficult Airway  NO--History of neck surgery or radiation  NO--Limited ROM of neck  NO--History of Malignant hyperthermia  NO--Personal or family history of Pseudocholinesterase deficiency  NO--Prior Anesthesia Complication  NO--Latex Allergy  NO--Loose teeth  NO--History of Rheumatoid Arthritis  NO--GUY  NO-Bleeding Risk s Cristian Jackman is a 66 yo male with PMH of HTN, HLD DM, Enlarged prostate, S/P REZUM procedure on 09/23/2022 with Mehta's catheter placed and it was  d/c' d at the office on 09/26/2022.  Then the patient c/o severe urinary retention and went [facility] ED and replaced the Mehta's cathter. Patient has been F/U by urologist and changed Mehta's cather last week and scheduled for the above procedure.     Patient denies any cp, sob, palpitations, fever, cough, URI, abdominal pains, N/V, UTI, Rashes or open wounds. Patient has a chronic skin conditions dry skin on left LLE.    As per patient exercise tolerance of 1-2 fos walks with out sob  Patient denies any s/s covid 19 and reports no contact with known positive people. Patient has appointment for repeat covid testing pre op and instructed to continue to self monitor and report any concerns to MD. Pt will continue to practice self isolation and  exposure control measures pre op  Anesthesia Alert  NO--Difficult Airway  NO--History of neck surgery or radiation  NO--Limited ROM of neck  NO--History of Malignant hyperthermia  NO--Personal or family history of Pseudocholinesterase deficiency  NO--Prior Anesthesia Complication  NO--Latex Allergy  NO--Loose teeth  NO--History of Rheumatoid Arthritis  NO--GUY  NO-Bleeding Risk  Pt instructed to stop vitamins/supplements/herbal medications for one week prior to surgery  As per patient this is the complete medical, surgical history and medications.   Cristian Jackman is a 66 yo male with PMH of HTN, HLD DM, Enlarged prostate, S/P REZUM procedure on 09/23/2022 with Mehta's catheter placed and it was d/c' d at the office on 09/26/2022. At the same night patient c/o severe urinary retention and went in to [facility] ED and replaced the Mehta's cathter. Patient has been F/U by urologist and changed Mehta's cathter last week and scheduled for the above procedure.     Patient denies any cp, sob, palpitations, fever, cough, URI, abdominal pains, N/V, UTI, Rashes or open wounds. Patient has a chronic skin conditions dry skin on left LLE.    As per patient exercise tolerance of 1-2 fos walks with out sob  Patient denies any s/s covid 19 and reports no contact with known positive people. Patient has appointment for repeat covid testing pre op and instructed to continue to self monitor and report any concerns to MD. Pt will continue to practice self isolation and  exposure control measures pre op  Anesthesia Alert  NO--Difficult Airway  NO--History of neck surgery or radiation  NO--Limited ROM of neck  NO--History of Malignant hyperthermia  NO--Personal or family history of Pseudocholinesterase deficiency  NO--Prior Anesthesia Complication  NO--Latex Allergy  NO--Loose teeth  NO--History of Rheumatoid Arthritis  NO--GUY  NO-Bleeding Risk  Pt instructed to stop vitamins/supplements/herbal medications for one week prior to surgery  As per patient this is the complete medical, surgical history and medications.

## 2022-11-01 NOTE — H&P PST ADULT - TEACHING/LEARNING LEARNING PREFERENCES
Patient Seen in: [facility] Emergency Department      History   Patient presents with:  Betzy-MARTA    Stated Complaint: STD check     HPI    Timoteo Bella is a 22-year-old male who presents today for an STD check. He denies past medical history.   He states that Normal rate and regular rhythm. Heart sounds: Normal heart sounds. Pulmonary:      Effort: Pulmonary effort is normal.      Breath sounds: Normal breath sounds. Genitourinary:     Pubic Area: No rash. Penis: Circumcised. Discharge present. soon as possible for a visit  As needed        Medications Prescribed:  There are no discharge medications for this patient. individual instruction

## 2022-11-01 NOTE — H&P PST ADULT - NSICDXPASTMEDICALHX_GEN_ALL_CORE_FT
PAST MEDICAL HISTORY:  BPH (benign prostatic hyperplasia)     Chronic GERD     DM (diabetes mellitus)     H/O heart valve insufficiency     H/O skin disorder Ichthyosis    H/O varicose veins bleeding from B/L LE    High cholesterol     HTN (hypertension)     OA (osteoarthritis)     PVD (peripheral vascular disease)      PAST MEDICAL HISTORY:  BPH (benign prostatic hyperplasia)     Chronic GERD     DM (diabetes mellitus)     H/O heart valve insufficiency     H/O skin disorder Ichthyosis    H/O varicose veins bleeding from B/L LE    High cholesterol     HTN (hypertension)     OA (osteoarthritis)     PVD (peripheral vascular disease)     Smoker EX smoker

## 2022-11-01 NOTE — H&P PST ADULT - PSYCHIATRIC
normal affect/alert and oriented x3/normal behavior/anxious No suicidal Thoughts/normal affect/alert and oriented x3/normal behavior/anxious

## 2022-11-01 NOTE — H&P PST ADULT - TEACHING/LEARNING EDUCATIONAL LEVEL
Note Text (......Xxx Chief Complaint.): This diagnosis correlates with the Detail Level: Zone Other (Free Text): Advised pt to do bleach baths [employer] school

## 2022-11-01 NOTE — H&P PST ADULT - URINARY CATHETER
no #16 FR Mehta's/yes Complex Repair And Flap Additional Text (Will Appearing After The Standard Complex Repair Text): The complex repair was not sufficient to completely close the primary defect. The remaining additional defect was repaired with the flap mentioned below.

## 2022-11-01 NOTE — H&P PST ADULT - REASON FOR ADMISSION
Case Type: OP   Suite: [facility]  Proceduralist: Liban Rodriguez  Confirmed Surgery DateTime: 11- - 0:00PAST DateTime: 11- - 17:45Procedure: CYSTOSCOPY TRANSURETHRAL RESECTION OF PROSTATE  Laterality: N/A  Length of Procedure: 90 Minutes  Anesthesia Type: General  Covid testing 11/5/2022 11.50 Case Type: OP   Suite: [facility]  Proceduralist: Liban Rodriguez  Confirmed Surgery Date Time: 11-   PAST Date Time: 11- - 17:45  Procedure: CYSTOSCOPY TRANSURETHRAL RESECTION OF PROSTATE  Laterality: N/A  Length of Procedure: 90 Minutes  Anesthesia Type: General  Covid testing 11/5/2022 11.50

## 2022-11-01 NOTE — H&P PST ADULT - NS PRO CESSATION MED OFFERED
[FreeTextEntry1] : CAD.  Stble mild angina.  Predictable pattern.  ordered prn TNG.
patient continue on over the counter cessation medication

## 2022-11-02 ENCOUNTER — APPOINTMENT (OUTPATIENT)
Dept: UROLOGY | Facility: CLINIC | Age: 67
End: 2022-11-02

## 2022-11-02 PROCEDURE — 99212 OFFICE O/P EST SF 10 MIN: CPT | Mod: 24

## 2022-11-04 ENCOUNTER — APPOINTMENT (OUTPATIENT)
Dept: UROLOGY | Facility: CLINIC | Age: 67
End: 2022-11-04

## 2022-11-04 PROBLEM — F17.200 NICOTINE DEPENDENCE, UNSPECIFIED, UNCOMPLICATED: Chronic | Status: ACTIVE | Noted: 2022-11-01

## 2022-11-04 PROBLEM — Z87.2 PERSONAL HISTORY OF DISEASES OF THE SKIN AND SUBCUTANEOUS TISSUE: Chronic | Status: ACTIVE | Noted: 2022-11-01

## 2022-11-04 PROCEDURE — 99024 POSTOP FOLLOW-UP VISIT: CPT

## 2022-11-04 NOTE — ASSESSMENT
[FreeTextEntry1] : 67-year-old status post REZUM.\par He has now had successful trial of void.  PVR now is 50 mL.\par Patient is currently feeling well and is satisfied with urination.\par \par Plan\par -Follow-up 1 month for bladder scan and uroflow.

## 2022-11-04 NOTE — HISTORY OF PRESENT ILLNESS
[FreeTextEntry1] : 67-year-old s/p OMAR.\par \par Postprocedure patient had multiple failed trials of void and developed urinary retention.\par He presents to the office 2 days ago for his final trial of void prior to undergoing a TURP.  Patient reports that he is urinating well on his own now.  He denies dysuria and gross hematuria.  His bladder scan PVR today is 50 mL.  And uroflow pattern reveals a low flow rate however it is a sustained flow rate.  Patient also urinated 85 mL which is low volume which could explain his low flow rate.\par \par Patient is currently satisfied with urination.  He request to cancel TURP.\par \par

## 2022-11-08 ENCOUNTER — APPOINTMENT (OUTPATIENT)
Dept: UROLOGY | Facility: HOSPITAL | Age: 67
End: 2022-11-08

## 2022-11-16 ENCOUNTER — APPOINTMENT (OUTPATIENT)
Dept: UROLOGY | Facility: CLINIC | Age: 67
End: 2022-11-16

## 2022-11-16 PROCEDURE — 99213 OFFICE O/P EST LOW 20 MIN: CPT | Mod: 24

## 2022-11-16 RX ORDER — ACETAMINOPHEN 325 MG/1
325 TABLET ORAL 3 TIMES DAILY
Qty: 36 | Refills: 0 | Status: COMPLETED | COMMUNITY
Start: 2022-08-17 | End: 2022-11-16

## 2022-11-16 RX ORDER — SULFAMETHOXAZOLE AND TRIMETHOPRIM 800; 160 MG/1; MG/1
800-160 TABLET ORAL TWICE DAILY
Qty: 6 | Refills: 0 | Status: COMPLETED | COMMUNITY
Start: 2022-08-17 | End: 2022-11-16

## 2022-11-16 RX ORDER — PHENAZOPYRIDINE HYDROCHLORIDE 100 MG/1
100 TABLET ORAL
Qty: 4 | Refills: 0 | Status: COMPLETED | COMMUNITY
Start: 2022-08-17 | End: 2022-11-16

## 2022-11-30 ENCOUNTER — APPOINTMENT (OUTPATIENT)
Dept: UROLOGY | Facility: CLINIC | Age: 67
End: 2022-11-30

## 2022-11-30 VITALS
HEIGHT: 64 IN | RESPIRATION RATE: 18 BRPM | HEART RATE: 108 BPM | BODY MASS INDEX: 26.63 KG/M2 | TEMPERATURE: 97.4 F | DIASTOLIC BLOOD PRESSURE: 90 MMHG | OXYGEN SATURATION: 98 % | SYSTOLIC BLOOD PRESSURE: 135 MMHG | WEIGHT: 156 LBS

## 2022-11-30 PROCEDURE — 99213 OFFICE O/P EST LOW 20 MIN: CPT | Mod: 24

## 2022-11-30 NOTE — HISTORY OF PRESENT ILLNESS
[FreeTextEntry1] : \par 67-year-old status post REZUM.\par He has now had successful trial of void.\par \par Now with clinical epididymal orchitis and scrotal swelling. Recommend ciprofloxacin 500 mg twice daily for 14 days which he completed. Patient confirms no known drug allergies to fluoroquinolone antibiotics.\par the pain has improved, though the swelling remains\par \par states that he is urinating well at this time\par

## 2022-12-02 NOTE — ASSESSMENT
[FreeTextEntry1] : 67-year-old status post REZUM. \par He has failed to trial of voids and developed urinary retention.\par \par scheduled for turp next week\par however an active trial of void showed a good flow today with proper emptying\par \par likely that he has recovered\par \par given his history he will drink plenty of fluid and wait in our waiting room for another void and bladder scan\par \par cysto on Oct 19 2022 did show possible ball valving from necrotic prostate tissue

## 2022-12-06 ENCOUNTER — OUTPATIENT (OUTPATIENT)
Dept: OUTPATIENT SERVICES | Facility: HOSPITAL | Age: 67
LOS: 1 days | Discharge: HOME | End: 2022-12-06

## 2022-12-06 DIAGNOSIS — N50.89 OTHER SPECIFIED DISORDERS OF THE MALE GENITAL ORGANS: ICD-10-CM

## 2022-12-06 DIAGNOSIS — Z87.19 PERSONAL HISTORY OF OTHER DISEASES OF THE DIGESTIVE SYSTEM: Chronic | ICD-10-CM

## 2022-12-06 DIAGNOSIS — Z98.890 OTHER SPECIFIED POSTPROCEDURAL STATES: Chronic | ICD-10-CM

## 2022-12-06 DIAGNOSIS — Z96.649 PRESENCE OF UNSPECIFIED ARTIFICIAL HIP JOINT: Chronic | ICD-10-CM

## 2022-12-06 PROCEDURE — 76870 US EXAM SCROTUM: CPT | Mod: 26

## 2022-12-08 ENCOUNTER — APPOINTMENT (OUTPATIENT)
Dept: UROLOGY | Facility: CLINIC | Age: 67
End: 2022-12-08

## 2022-12-08 PROCEDURE — 99213 OFFICE O/P EST LOW 20 MIN: CPT | Mod: 24,95

## 2022-12-08 NOTE — HISTORY OF PRESENT ILLNESS
[Home] : at home, [unfilled] , at the time of the visit. [Medical Office: ([facility])___] : at the medical office located in  [Verbal consent obtained from patient] : the patient, [unfilled] [FreeTextEntry1] : \par 67-year-old status post REZUM.\par \par \par states that he is urinating well at this time\par had clinical epididymal orchitis and scrotal swelling- treated with cipro. \par the pain has improved, though the swelling remains - aware that this may remain for weeks\par \par \par Dec 2022\par US Testicles; bilateral microlithiasis - without mass\par large hydrocele, left hydrocele\par 4.4cm epdidiymal body\par left epididymal head cyst -- up to 6mm \par \par doing well\par \par \par [phone]\par [email or web address]

## 2022-12-08 NOTE — ASSESSMENT
[FreeTextEntry1] : 67-year-old status post REZUM.\par \par \par states that he is urinating well at this time\par had clinical epididymal orchitis and scrotal swelling- treated with cipro. \par the pain has improved, though the swelling remains - aware that this may remain for weeks\par \par \par Dec 2022\par US Testicles; bilateral microlithiasis - without mass\par large hydrocele, left hydrocele\par 4.4cm epdidiymal body\par left epididymal head cyst -- up to 6mm \par \par doing well

## 2022-12-12 NOTE — ASSESSMENT
[FreeTextEntry1] : 67-year-old status post REZUM.\par He has now had successful trial of void.\par \par Now with clinical epididymal orchitis.  Recommend ciprofloxacin 500 mg twice daily for 14 days.  Patient confirms no known drug allergies to fluoroquinolone antibiotics.\par \par Patient follow-up as scheduled for reevaluation.\par Follow-up sooner as needed\par \par Uroflow was reviewed with patient today.\par \par Advil as needed for pain.

## 2022-12-12 NOTE — PHYSICAL EXAM
[Well Groomed] : well groomed [General Appearance - In No Acute Distress] : no acute distress [] : no respiratory distress [Oriented To Time, Place, And Person] : oriented to person, place, and time [Normal Station and Gait] : the gait and station were normal for the patient's age [No Focal Deficits] : no focal deficits [FreeTextEntry1] : Asymmetrical swelling to left side of scrotum.  Firm testicle painful to palpation.

## 2022-12-12 NOTE — HISTORY OF PRESENT ILLNESS
[FreeTextEntry1] : 67 year old s.maria ines NELSON. \par Postprocedure patient had multiple failed TOV. He was scheduled for TURP, however he had a successful TOV on 11/04/2022. He presents to office today for UROFLOW and PVR. UROFLOW revealed a peak flow rate of 7.8 ml/s and average flow rate of 2.9 ml/s. Voided volume 75 cc and PVR of 39 cc. Patient did not have adequate volume for proper Uroflow study. \par \par Patient does complain of testicular swelling and pain. He states that pain is alleviated with Advil. He denies dysuria and gross hematuria.

## 2022-12-16 ENCOUNTER — APPOINTMENT (OUTPATIENT)
Dept: UROLOGY | Facility: CLINIC | Age: 67
End: 2022-12-16

## 2022-12-16 VITALS
HEIGHT: 64 IN | DIASTOLIC BLOOD PRESSURE: 80 MMHG | WEIGHT: 150 LBS | BODY MASS INDEX: 25.61 KG/M2 | SYSTOLIC BLOOD PRESSURE: 140 MMHG

## 2022-12-16 PROCEDURE — 99213 OFFICE O/P EST LOW 20 MIN: CPT | Mod: 24

## 2022-12-16 NOTE — ASSESSMENT
[FreeTextEntry1] : 67-year-old status post REZUM on Sept 23 2022\par \par 3 month post visit:\par qmax = 9.6ml/s\par pvr after 15mins 100ml\par \par states that he is urinating well at this time\par had clinical epididymal orchitis and scrotal swelling- treated with cipro. \par the pain has improved, though the swelling remains - aware that this may remain for weeks\par \par Dec 2022\par US Testicles; bilateral microlithiasis - without mass\par large hydrocele, left hydrocele\par 4.4cm epdidiymal body\par left epididymal head cyst -- up to 6mm \par \par doing well but concerned about swelling \par \par orchitis improved though swelling remains -- aware that may have a reactive hydrocele -- will keep an eye on it and if becomes an issue will discuss hydrocelectomy \par

## 2023-01-10 ENCOUNTER — OUTPATIENT (OUTPATIENT)
Dept: OUTPATIENT SERVICES | Facility: HOSPITAL | Age: 68
LOS: 1 days | Discharge: HOME | End: 2023-01-10
Payer: MEDICARE

## 2023-01-10 ENCOUNTER — RESULT REVIEW (OUTPATIENT)
Age: 68
End: 2023-01-10

## 2023-01-10 DIAGNOSIS — Z96.649 PRESENCE OF UNSPECIFIED ARTIFICIAL HIP JOINT: Chronic | ICD-10-CM

## 2023-01-10 DIAGNOSIS — Z87.19 PERSONAL HISTORY OF OTHER DISEASES OF THE DIGESTIVE SYSTEM: Chronic | ICD-10-CM

## 2023-01-10 DIAGNOSIS — N43.3 HYDROCELE, UNSPECIFIED: ICD-10-CM

## 2023-01-10 DIAGNOSIS — Z98.890 OTHER SPECIFIED POSTPROCEDURAL STATES: Chronic | ICD-10-CM

## 2023-01-10 PROCEDURE — 76870 US EXAM SCROTUM: CPT | Mod: 26

## 2023-01-17 ENCOUNTER — APPOINTMENT (OUTPATIENT)
Dept: UROLOGY | Facility: CLINIC | Age: 68
End: 2023-01-17
Payer: MEDICARE

## 2023-01-17 VITALS
RESPIRATION RATE: 18 BRPM | HEIGHT: 64 IN | DIASTOLIC BLOOD PRESSURE: 87 MMHG | BODY MASS INDEX: 25.61 KG/M2 | OXYGEN SATURATION: 99 % | TEMPERATURE: 98 F | WEIGHT: 150 LBS | HEART RATE: 99 BPM | SYSTOLIC BLOOD PRESSURE: 145 MMHG

## 2023-01-17 PROCEDURE — 99214 OFFICE O/P EST MOD 30 MIN: CPT

## 2023-01-17 RX ORDER — CIPROFLOXACIN HYDROCHLORIDE 500 MG/1
500 TABLET, FILM COATED ORAL
Qty: 28 | Refills: 0 | Status: COMPLETED | COMMUNITY
Start: 2022-11-16 | End: 2023-01-17

## 2023-01-19 ENCOUNTER — NON-APPOINTMENT (OUTPATIENT)
Age: 68
End: 2023-01-19

## 2023-01-19 LAB
PSA FREE FLD-MCNC: 18 %
PSA FREE SERPL-MCNC: 0.27 NG/ML
PSA SERPL-MCNC: 1.46 NG/ML

## 2023-01-20 NOTE — PHYSICAL EXAM
[General Appearance - Well Developed] : well developed [General Appearance - Well Nourished] : well nourished [Normal Appearance] : normal appearance [Well Groomed] : well groomed [General Appearance - In No Acute Distress] : no acute distress [Abdomen Soft] : soft [Abdomen Tenderness] : non-tender [Costovertebral Angle Tenderness] : no ~M costovertebral angle tenderness [Urethral Meatus] : meatus normal [Penis Abnormality] : normal circumcised penis [Urinary Bladder Findings] : the bladder was normal on palpation [Scrotum] : the scrotum was normal [Testes Tenderness] : no tenderness of the testes [Testes Mass (___cm)] : there were no testicular masses [] : no respiratory distress [Respiration, Rhythm And Depth] : normal respiratory rhythm and effort [Exaggerated Use Of Accessory Muscles For Inspiration] : no accessory muscle use [Oriented To Time, Place, And Person] : oriented to person, place, and time [Affect] : the affect was normal [Mood] : the mood was normal [Not Anxious] : not anxious [Normal Station and Gait] : the gait and station were normal for the patient's age [No Focal Deficits] : no focal deficits [FreeTextEntry1] : Large right hydrocele

## 2023-01-20 NOTE — ADDENDUM
[FreeTextEntry1] : Agree with the above documentation as outlined by the PA which I have addended as necessary.\par The submitted E/M billing level for this visit reflects the total time spent on the day of the visit including face-to-face time spent with the patient, non-face-to-face review of medical records and relevant information, documentation, and asynchronous communication with the patient after a visit via phone, email, or patient’s EHR portal after the visit. \par The medical records reviewed are either scanned into the chart or reviewed with the patient using a patient’s electronic medical records portal for patients with records not available to [facility] via electronic transmission platforms from other institutions and labs. \par Time spend counseling and performing coordination of care was also included in determining the appropriate EM billing level.\par

## 2023-01-20 NOTE — ASSESSMENT
[FreeTextEntry1] : MARILYN MORELOS is a 67 year old male prediabetic, who presents for consultation for BPH/LUTS on rapaflo/finasteride previously seen by outside urologist. Patient has been on finasteride since 2018. PSA stable. Worsening LUTS.  Uroflow study demonstrates poor voiding pattern with decreased Q-Blake/average flow and elevated PVR of 197.  He status post rezum procedure on 9/23/2022 with urinary retention post.  He finally underwent successful trial of void and has been voiding well with some increased urinary frequency/urgency.\par PVR 19 cc.  Ultrasound demonstrates large left hydrocele.  Patient not bothered by it at this time.\par \par Plan\par Continue Rapaflo and finasteride for BPH.\par PSA today\par Follow-up 3 months uroflow study\par

## 2023-02-03 ENCOUNTER — APPOINTMENT (OUTPATIENT)
Dept: UROLOGY | Facility: CLINIC | Age: 68
End: 2023-02-03
Payer: MEDICARE

## 2023-02-03 DIAGNOSIS — N50.89 OTHER SPECIFIED DISORDERS OF THE MALE GENITAL ORGANS: ICD-10-CM

## 2023-02-03 PROCEDURE — 99213 OFFICE O/P EST LOW 20 MIN: CPT

## 2023-02-03 NOTE — ASSESSMENT
[FreeTextEntry1] : \par 67-year-old status post REZUM on Sept 23 2022\par \par 3 month post visit:\par qmax = 9.6ml/s\par pvr after 15mins 100ml\par \par states that he is urinating well at this time\par had clinical epididymal orchitis and scrotal swelling- treated with cipro. \par the pain has improved, though the swelling remains - aware that this may remain for weeks\par \par Dec 2022\par US Testicles; bilateral microlithiasis - without mass\par large hydrocele, left hydrocele\par 4.4cm epdidiymal body\par left epididymal head cyst -- up to 6mm \par \par doing well but concerned about swelling \par \par orchitis improved though swelling remains -- aware that may have a reactive hydrocele -- will keep an eye on it and if becomes an issue will discuss hydrocelectomy \par \par Jan 2023\par US Testicles; 4.7cm right epididymal vs tunica cyst, 128ml right hydrocele. 4.2mm right varicocele with valsalva/ \par \par Jan 2023\par PSA - 1.46\par \par can stop finasteride but he wants to continue for now\par continue silodosin for now\par

## 2023-02-03 NOTE — HISTORY OF PRESENT ILLNESS
[FreeTextEntry1] : \par 67-year-old status post REZUM on Sept 23 2022\par \par 3 month post visit:\par qmax = 9.6ml/s\par pvr after 15mins 100ml\par \par states that he is urinating well at this time\par had clinical epididymal orchitis and scrotal swelling- treated with cipro. \par the pain has improved, though the swelling remains - aware that this may remain for weeks\par \par Dec 2022\par US Testicles; bilateral microlithiasis - without mass\par large hydrocele, left hydrocele\par 4.4cm epdidiymal body\par left epididymal head cyst -- up to 6mm \par \par doing well but concerned about swelling \par \par orchitis improved though swelling remains -- aware that may have a reactive hydrocele -- will keep an eye on it and if becomes an issue will discuss hydrocelectomy \par \par Jan 2023\par US Testicles; 4.7cm right epididymal vs tunica cyst, 128ml right hydrocele. 4.2mm right varicocele with valsalva/ \par \par Jan 2023\par PSA - 1.46\par \par can stop finasteride but he wants to continue for now\par continue silodosin for now\par \par

## 2023-03-02 ENCOUNTER — OUTPATIENT (OUTPATIENT)
Dept: OUTPATIENT SERVICES | Facility: HOSPITAL | Age: 68
LOS: 1 days | End: 2023-03-02
Payer: MEDICARE

## 2023-03-02 DIAGNOSIS — Z98.890 OTHER SPECIFIED POSTPROCEDURAL STATES: Chronic | ICD-10-CM

## 2023-03-02 DIAGNOSIS — Z00.8 ENCOUNTER FOR OTHER GENERAL EXAMINATION: ICD-10-CM

## 2023-03-02 DIAGNOSIS — N40.1 BENIGN PROSTATIC HYPERPLASIA WITH LOWER URINARY TRACT SYMPTOMS: ICD-10-CM

## 2023-03-02 DIAGNOSIS — Z87.19 PERSONAL HISTORY OF OTHER DISEASES OF THE DIGESTIVE SYSTEM: Chronic | ICD-10-CM

## 2023-03-02 DIAGNOSIS — Z96.649 PRESENCE OF UNSPECIFIED ARTIFICIAL HIP JOINT: Chronic | ICD-10-CM

## 2023-03-02 PROCEDURE — 76857 US EXAM PELVIC LIMITED: CPT

## 2023-03-02 PROCEDURE — 76857 US EXAM PELVIC LIMITED: CPT | Mod: 26

## 2023-03-03 DIAGNOSIS — N40.1 BENIGN PROSTATIC HYPERPLASIA WITH LOWER URINARY TRACT SYMPTOMS: ICD-10-CM

## 2023-03-15 ENCOUNTER — APPOINTMENT (OUTPATIENT)
Dept: UROLOGY | Facility: CLINIC | Age: 68
End: 2023-03-15
Payer: MEDICARE

## 2023-03-15 PROCEDURE — 99214 OFFICE O/P EST MOD 30 MIN: CPT

## 2023-03-15 RX ORDER — FINASTERIDE 5 MG/1
5 TABLET, FILM COATED ORAL DAILY
Qty: 90 | Refills: 3 | Status: DISCONTINUED | COMMUNITY
Start: 2021-05-11 | End: 2023-03-15

## 2023-03-15 NOTE — HISTORY OF PRESENT ILLNESS
[FreeTextEntry1] : 67-year-old status post REZUM on Sept 23 2022\par \par 3 month post visit:\par qmax = 9.6ml/s\par pvr after 15mins 100ml\par \par states that he is urinating well at this time\par had clinical epididymal orchitis and scrotal swelling- treated with cipro. \par currently complains of urgency \par \par Dec 2022\par US Testicles; bilateral microlithiasis - without mass\par large hydrocele, left hydrocele\par 4.4cm epdidiymal body\par left epididymal head cyst -- up to 6mm \par \par doing well but concerned about swelling \par \par orchitis improved though swelling remains -- aware that may have a reactive hydrocele -- will keep an eye on it and if becomes an issue will discuss hydrocelectomy \par \par Jan 2023\par US Testicles; 4.7cm right epididymal vs tunica cyst, 128ml right hydrocele. 4.2mm right varicocele with valsalva/ \par \par Jan 2023\par PSA - 1.46\par \par continue silodosin for now\par \par  March 2023\par US Urinary Bladder;\par prostate 25g, pvr 5ml\par Qmax = 16.3ml/s-- curve bell shaped

## 2023-03-16 RX ORDER — SILODOSIN 8 MG/1
8 CAPSULE ORAL DAILY
Qty: 90 | Refills: 3 | Status: ACTIVE | COMMUNITY
Start: 2023-03-16 | End: 1900-01-01

## 2023-03-20 RX ORDER — MIRABEGRON 25 MG/1
25 TABLET, FILM COATED, EXTENDED RELEASE ORAL
Qty: 30 | Refills: 5 | Status: DISCONTINUED | COMMUNITY
Start: 2021-07-19 | End: 2023-03-20

## 2023-04-18 ENCOUNTER — APPOINTMENT (OUTPATIENT)
Dept: UROLOGY | Facility: CLINIC | Age: 68
End: 2023-04-18
Payer: MEDICARE

## 2023-04-18 DIAGNOSIS — N13.9 OBSTRUCTIVE AND REFLUX UROPATHY, UNSPECIFIED: ICD-10-CM

## 2023-04-18 DIAGNOSIS — R39.12 POOR URINARY STREAM: ICD-10-CM

## 2023-04-18 PROCEDURE — 99214 OFFICE O/P EST MOD 30 MIN: CPT

## 2023-04-18 RX ORDER — METHYLPREDNISOLONE 4 MG/1
4 TABLET ORAL
Qty: 1 | Refills: 0 | Status: COMPLETED | COMMUNITY
Start: 2022-09-30 | End: 2023-04-18

## 2023-04-22 NOTE — HISTORY OF PRESENT ILLNESS
[FreeTextEntry1] : MARILYN MORELOS is a 68 year old male prediabetic, who presents for consultation for BPH/LUTS on rapaflo/finasteride previously seen by outside urologist. Patient has been on finasteride since 2018. PSA stable. Worsening LUTS.  Uroflow study demonstrates poor voiding pattern with decreased Q-Blake/average flow and elevated PVR of 197.  He status post rezum procedure on 9/23/2022 with urinary retention post.  He finally underwent successful trial of void and has been voiding well with some increased urinary frequency/urgency.\par \par He discontinued dutasteride and remains on silodosin.  At his last visit with Dr. Rodriguez he was prescribed Gemteza. He reports some improvement some improvement however he still having some frequency and urgency.\par \par Uroflow study today demonstrates normal voiding pattern with Q-Blake/average flow of 9.2/5.4 with a voided volume of 94 cc and a postvoid residual 21 cc.\par \par Bladder ultrasound, from 3/10/2023 demonstrates a 25 g gland with a prevoid of 75 cc and postvoid of 5 cc, normal bladder ultrasound.\par \par He still on dutasteride and silodosin at this time.  He has not yet obtain PSA testing.\par He had left testicular swelling postprocedure.  He states there is no pain or discomfort associated with this.\par \par Scrotal ultrasound from 1/10/2023 demonstrated large right and small left hydroceles with a 4.8 cm right epididymal versus para epididymal tunical cyst.  Bilateral testicular microlithiasis. images visualized and agree w findings \par \par PVR 19 cc\par \par Previously:\par \par Uroflow study tracings demonstrates prolonged voiding time with plateaued voiding pattern.  Q-Blake/average flow is 11.4/7 with a voided volume of 195 cc and the postvoid residual of 197 cc\par \par PSA=2.26, % free = 33%  from 2/2022 \par PSA= 2.48 percent free 41 % from 10/2021 \par \par MRI prostate images demonstrating 49 g prostate with mild intravesical protrusion no obvious T2 peripheral zone lesions however significant artifact from hip prosthesis limits evaluation for clinically significant prostate cancer as diffusion-weighted imaging is limited.\par \par Bladder ultrasound images from 10/26/2021 demonstrates a 43 g prostate with a prevoid volume of 170 cc and the postvoid volume of 45g,  yielding a PSA density of 0.115, intravesical mild wide protrusion\par \par Denies  PMH including previous kidney stones, recurrent UTIs. \par Family History: No  malignancies. Brother had TURP procedure. \par Social History: School safety. Pt reports he is not consuming bladder irritants. \par \par PSA=3.0  percent free 30 from 12/2020 \par PSA=4.5  11/ 2018 \par PSA=6  10/ 2018 \par \par Prostate TRUS: 45 grams prostate from 2014 \par \par UDS from 2018 demonstrates: normal Bladder compliance, max V=121 cc. Voiding phase, max detrusor pressure 194 cc, Qmax= 8 ml/sec. Tracing were reviewed: there is a sustained detrusor contraction, machine does not appear to be completely calibrated, somewhat difficult to interpret. \par \par RBUS images 7/2020: no hydronephrosis bL no stones BL, bladder unremarkable ZOJ=677 cc. \par Prostate V not measured however appears to have intravesical protrusion. \par \par

## 2023-04-22 NOTE — ASSESSMENT
[FreeTextEntry1] : MARILYN MORELOS is a 68 year old male prediabetic, who presents for consultation for BPH/LUTS on rapaflo/finasteride previously seen by outside urologist. Patient has been on finasteride since 2018. PSA stable. Worsening LUTS.  Uroflow study demonstrates poor voiding pattern with decreased Q-Blake/average flow and elevated PVR of 197.  He status post rezum procedure on 9/23/2022 with urinary retention post.  He finally underwent successful trial of void and has been voiding well with some increased urinary frequency/urgency.  \par \par Symptoms improving on  Gemteza.uroflow study demonstrates normal voiding pattern with decreased Q-Blake/average flow.  Ultrasound demonstrates large left hydrocele.  Patient not bothered by it at this time.\par \par Plan\par Continue Rapaflo and Gemtieza\par PSA and follow up in 6 months (discontinued finasteride in March)\par Continue follow-up with Dr. Rodriguez\par \par \par

## 2023-04-22 NOTE — ADDENDUM
[FreeTextEntry1] : Agree with the above documentation as outlined by the PA which I have addended as necessary.\par The submitted E/M billing level for this visit reflects the total time spent on the day of the visit including face-to-face time spent with the patient, non-face-to-face review of medical records and relevant information, documentation, and asynchronous communication with the patient after a visit via phone, email, or patient’s EHR portal after the visit. \par The medical records reviewed are either scanned into the chart or reviewed with the patient using a patient’s electronic medical records portal for patients with records not available to [facility] via electronic transmission platforms from other institutions and labs. \par Time spend counseling and performing coordination of care was also included in determining the appropriate EM billing level.\par

## 2023-05-03 ENCOUNTER — APPOINTMENT (OUTPATIENT)
Dept: UROLOGY | Facility: CLINIC | Age: 68
End: 2023-05-03
Payer: MEDICARE

## 2023-05-03 VITALS
BODY MASS INDEX: 26.8 KG/M2 | DIASTOLIC BLOOD PRESSURE: 80 MMHG | HEIGHT: 64 IN | SYSTOLIC BLOOD PRESSURE: 140 MMHG | HEART RATE: 87 BPM | WEIGHT: 157 LBS

## 2023-05-03 PROCEDURE — 99213 OFFICE O/P EST LOW 20 MIN: CPT

## 2023-05-03 NOTE — ASSESSMENT
[FreeTextEntry1] : 67-year-old status post REZUM on Sept 23 2022\par \par 3 month post visit:\par qmax = 9.6ml/s\par pvr after 15mins 100ml\par \par states that he is urinating well at this time as far as flow but urgency is still a problem\par had clinical epididymal orchitis and scrotal swelling- treated with cipro. \par currently complains of urgency \par \par orchitis improved though swelling remains -- aware that may have a reactive hydrocele -- will keep an eye on it and if becomes an issue will discuss hydrocelectomy \par \par Dec 2022\par US Testicles; bilateral microlithiasis - without mass\par large hydrocele, left hydrocele\par 4.4cm epdidiymal body\par left epididymal head cyst -- up to 6mm \par \par Jan 2023\par US Testicles; 4.7cm right epididymal vs tunica cyst, 128ml right hydrocele. 4.2mm right varicocele with valsalva/ \par \par Jan 2023\par PSA - 1.46\par \par  March 2023\par US Urinary Bladder;\par prostate 25g, pvr 5ml\par Qmax = 16.3ml/s-- curve bell shaped \par

## 2023-06-12 ENCOUNTER — APPOINTMENT (OUTPATIENT)
Dept: UROLOGY | Facility: CLINIC | Age: 68
End: 2023-06-12
Payer: MEDICARE

## 2023-06-12 VITALS
RESPIRATION RATE: 16 BRPM | DIASTOLIC BLOOD PRESSURE: 79 MMHG | SYSTOLIC BLOOD PRESSURE: 114 MMHG | HEIGHT: 64 IN | BODY MASS INDEX: 26.8 KG/M2 | WEIGHT: 157 LBS | OXYGEN SATURATION: 98 % | TEMPERATURE: 97 F | HEART RATE: 90 BPM

## 2023-06-12 PROCEDURE — 99214 OFFICE O/P EST MOD 30 MIN: CPT

## 2023-06-13 RX ORDER — CARBAMIDE PEROXIDE 6.5 %
6.5 DROPS OTIC (EAR)
Qty: 15 | Refills: 0 | Status: ACTIVE | COMMUNITY
Start: 2023-04-21

## 2023-06-13 RX ORDER — NIFEDIPINE 90 MG/1
90 TABLET, EXTENDED RELEASE ORAL
Qty: 90 | Refills: 0 | Status: ACTIVE | COMMUNITY
Start: 2023-06-05

## 2023-06-13 RX ORDER — NEOMYCIN SULFATE, POLYMYXIN B SULFATE, HYDROCORTISONE 3.5; 10000; 1 MG/ML; [USP'U]/ML; MG/ML
1 SOLUTION/ DROPS AURICULAR (OTIC)
Qty: 10 | Refills: 0 | Status: ACTIVE | COMMUNITY
Start: 2023-05-01

## 2023-06-13 NOTE — HISTORY OF PRESENT ILLNESS
[FreeTextEntry1] : 68 year old male who complains of a long hx of urinary urgency and urge incontinence. He has been tried on multiple medications including Mirabegron and Silodosin with no success. He was previously followed by Dr. Perez and underwent a Rezum with Dr. Rodriguez. After the Rezum, the pt has had a few episodes of urinary retention, but has been voiding well for the past many months. He comes in today to discuss his refractory overactive bladder. He voids q30 mins during the day and has nocturia x2-3. He denies any gross hematuria, dysuria, or UTIs. Pt has mild back issues, but denies any disc disease or stenosis. He has daily bowel movements\par \par pmhx: DM for 4-5 years, currently on Metformin, HTN

## 2023-06-13 NOTE — PHYSICAL EXAM
[General Appearance - Well Nourished] : well nourished [Normal Appearance] : normal appearance [General Appearance - In No Acute Distress] : no acute distress [Oriented To Time, Place, And Person] : oriented to person, place, and time [Affect] : the affect was normal [FreeTextEntry1] : The legs appear to have some degree of edema present.

## 2023-06-13 NOTE — ASSESSMENT
[FreeTextEntry1] : 68 year old male with a refractory overactive bladder, hx of BPH--now with good stream s/p a Rezum. We discussed this in some detail and I asked him to complete a voiding diary. Full instructions were given on how to do this. Pt can then follow up with a VUDS. We discussed this and what information it provides. We discussed his refractory overactive bladder and the options for treatment. I asked him to consider coming off of the Silodosin, but he would like to wait until after the VUDS. All the pt's questions were otherwise answered. Total time = 30 mins. \par \par The submitted E/M billing level for this visit reflects the total time spent on the day of the visit including face-to-face time spent with the patient, non-face-to-face review of medical records and relevant information, documentation, and asynchronous communication with the patient after a visit via phone, email, or patient’s EHR portal after the visit. \par The medical records reviewed are either scanned into the chart or reviewed with the patient using a patient’s electronic medical records portal for patients with records not available to [facility] via electronic transmission platforms from other institutions and labs. \par Time spend counseling and performing coordination of care was also included in determining the appropriate EM billing level.\par \par I have reviewed and verified information regarding the chief complaint and history recorded by the ancillary staff and/or the patient. I have independently reviewed and interpreted tests performed by other physicians and facilities as necessary. \par \par I have discussed with the patient differential diagnosis, reason for auxiliary tests if ordered, risks, benefits, alternatives, and complications of each form of therapy were discussed.\par

## 2023-07-06 ENCOUNTER — NON-APPOINTMENT (OUTPATIENT)
Age: 68
End: 2023-07-06

## 2023-08-10 ENCOUNTER — OUTPATIENT (OUTPATIENT)
Dept: OUTPATIENT SERVICES | Facility: HOSPITAL | Age: 68
LOS: 1 days | End: 2023-08-10
Payer: MEDICARE

## 2023-08-10 ENCOUNTER — APPOINTMENT (OUTPATIENT)
Dept: UROLOGY | Facility: HOSPITAL | Age: 68
End: 2023-08-10
Payer: MEDICARE

## 2023-08-10 DIAGNOSIS — Z96.649 PRESENCE OF UNSPECIFIED ARTIFICIAL HIP JOINT: Chronic | ICD-10-CM

## 2023-08-10 DIAGNOSIS — R39.15 URGENCY OF URINATION: ICD-10-CM

## 2023-08-10 DIAGNOSIS — Z98.890 OTHER SPECIFIED POSTPROCEDURAL STATES: Chronic | ICD-10-CM

## 2023-08-10 DIAGNOSIS — R35.0 FREQUENCY OF MICTURITION: ICD-10-CM

## 2023-08-10 DIAGNOSIS — N39.41 URGE INCONTINENCE: ICD-10-CM

## 2023-08-10 DIAGNOSIS — R33.9 RETENTION OF URINE, UNSPECIFIED: ICD-10-CM

## 2023-08-10 DIAGNOSIS — N40.1 BENIGN PROSTATIC HYPERPLASIA WITH LOWER URINARY TRACT SYMPTOMS: ICD-10-CM

## 2023-08-10 DIAGNOSIS — E11.9 TYPE 2 DIABETES MELLITUS WITHOUT COMPLICATIONS: ICD-10-CM

## 2023-08-10 DIAGNOSIS — Z87.19 PERSONAL HISTORY OF OTHER DISEASES OF THE DIGESTIVE SYSTEM: Chronic | ICD-10-CM

## 2023-08-10 PROCEDURE — 51784 ANAL/URINARY MUSCLE STUDY: CPT | Mod: 26

## 2023-08-10 PROCEDURE — 51728 CYSTOMETROGRAM W/VP: CPT | Mod: 26

## 2023-08-10 PROCEDURE — 74455 X-RAY URETHRA/BLADDER: CPT | Mod: 26

## 2023-08-10 PROCEDURE — 51600 INJECTION FOR BLADDER X-RAY: CPT

## 2023-08-10 PROCEDURE — 51797 INTRAABDOMINAL PRESSURE TEST: CPT | Mod: 26

## 2023-08-10 PROCEDURE — 76000 FLUOROSCOPY <1 HR PHYS/QHP: CPT

## 2023-08-10 PROCEDURE — 51797 INTRAABDOMINAL PRESSURE TEST: CPT

## 2023-08-10 PROCEDURE — 51728 CYSTOMETROGRAM W/VP: CPT

## 2023-08-10 PROCEDURE — 51784 ANAL/URINARY MUSCLE STUDY: CPT

## 2023-09-26 ENCOUNTER — APPOINTMENT (OUTPATIENT)
Dept: UROLOGY | Facility: CLINIC | Age: 68
End: 2023-09-26
Payer: MEDICARE

## 2023-09-26 VITALS
BODY MASS INDEX: 26.8 KG/M2 | TEMPERATURE: 97 F | DIASTOLIC BLOOD PRESSURE: 67 MMHG | SYSTOLIC BLOOD PRESSURE: 104 MMHG | HEIGHT: 64 IN | WEIGHT: 157 LBS | HEART RATE: 106 BPM

## 2023-09-26 DIAGNOSIS — N45.2 ORCHITIS: ICD-10-CM

## 2023-09-26 PROCEDURE — 99214 OFFICE O/P EST MOD 30 MIN: CPT

## 2023-09-27 LAB
BILIRUB UR QL STRIP: NORMAL
COLLECTION METHOD: NORMAL
GLUCOSE UR-MCNC: NORMAL
HCG UR QL: 0.2 EU/DL
HGB UR QL STRIP.AUTO: NORMAL
KETONES UR-MCNC: NORMAL
LEUKOCYTE ESTERASE UR QL STRIP: NORMAL
NITRITE UR QL STRIP: NORMAL
PH UR STRIP: 6.5
PROT UR STRIP-MCNC: NORMAL
SP GR UR STRIP: 1.01

## 2023-09-29 PROBLEM — N45.2 ORCHITIS, LEFT: Status: ACTIVE | Noted: 2022-11-16

## 2023-10-16 NOTE — ED ADULT NURSE NOTE - NSFALLRSKASSESSTYPE_ED_ALL_ED
Patient calling in for refill on linzess 290 mcg, her last OV with Dr. Melissa Puente was on 9/12/23.
Initial (On Arrival)
[Effusion] : effusion
[4+] : 4+
[Normal Gait and Station] : normal gait and station
[Normal muscle strength, symmetry and tone of facial, head and neck musculature] : normal muscle strength, symmetry and tone of facial, head and neck musculature
[Normal] : no cervical lymphadenopathy
[Exposed Vessel] : left anterior vessel not exposed
[Increased Work of Breathing] : no increased work of breathing with use of accessory muscles and retractions

## 2023-10-23 ENCOUNTER — APPOINTMENT (OUTPATIENT)
Dept: UROLOGY | Facility: CLINIC | Age: 68
End: 2023-10-23
Payer: MEDICARE

## 2023-10-23 VITALS
SYSTOLIC BLOOD PRESSURE: 122 MMHG | HEART RATE: 92 BPM | DIASTOLIC BLOOD PRESSURE: 78 MMHG | HEIGHT: 64 IN | TEMPERATURE: 98 F | BODY MASS INDEX: 26.98 KG/M2 | WEIGHT: 158 LBS

## 2023-10-23 PROCEDURE — 99214 OFFICE O/P EST MOD 30 MIN: CPT

## 2023-11-08 ENCOUNTER — RX CHANGE (OUTPATIENT)
Age: 68
End: 2023-11-08

## 2023-11-08 RX ORDER — VIBEGRON 75 MG/1
75 TABLET, FILM COATED ORAL
Qty: 30 | Refills: 5 | Status: DISCONTINUED | COMMUNITY
Start: 2023-03-20 | End: 2023-11-08

## 2023-11-14 ENCOUNTER — APPOINTMENT (OUTPATIENT)
Dept: UROLOGY | Facility: CLINIC | Age: 68
End: 2023-11-14
Payer: MEDICARE

## 2023-11-14 DIAGNOSIS — R33.9 RETENTION OF URINE, UNSPECIFIED: ICD-10-CM

## 2023-11-14 PROCEDURE — 52000 CYSTOURETHROSCOPY: CPT

## 2023-11-14 PROCEDURE — 81003 URINALYSIS AUTO W/O SCOPE: CPT | Mod: QW

## 2023-11-17 PROBLEM — R33.9 URINARY RETENTION: Status: ACTIVE | Noted: 2022-09-27

## 2024-01-16 ENCOUNTER — APPOINTMENT (OUTPATIENT)
Dept: UROLOGY | Facility: CLINIC | Age: 69
End: 2024-01-16

## 2024-03-05 ENCOUNTER — APPOINTMENT (OUTPATIENT)
Dept: UROLOGY | Facility: CLINIC | Age: 69
End: 2024-03-05
Payer: MEDICARE

## 2024-03-05 VITALS
SYSTOLIC BLOOD PRESSURE: 147 MMHG | BODY MASS INDEX: 26.8 KG/M2 | HEART RATE: 84 BPM | HEIGHT: 64 IN | OXYGEN SATURATION: 96 % | WEIGHT: 157 LBS | DIASTOLIC BLOOD PRESSURE: 82 MMHG

## 2024-03-05 DIAGNOSIS — R97.20 ELEVATED PROSTATE, SPECIFIC ANTIGEN [PSA]: ICD-10-CM

## 2024-03-05 PROCEDURE — G2211 COMPLEX E/M VISIT ADD ON: CPT

## 2024-03-05 PROCEDURE — 99214 OFFICE O/P EST MOD 30 MIN: CPT

## 2024-03-05 RX ORDER — FINASTERIDE 5 MG/1
5 TABLET, FILM COATED ORAL DAILY
Qty: 90 | Refills: 3 | Status: ACTIVE | COMMUNITY
Start: 2024-03-05 | End: 1900-01-01

## 2024-03-05 RX ORDER — VIBEGRON 75 MG/1
75 TABLET, FILM COATED ORAL
Qty: 90 | Refills: 2 | Status: COMPLETED | COMMUNITY
End: 2024-03-05

## 2024-03-05 NOTE — PHYSICAL EXAM
[General Appearance - Well Developed] : well developed [Normal Appearance] : normal appearance [General Appearance - Well Nourished] : well nourished [Well Groomed] : well groomed [General Appearance - In No Acute Distress] : no acute distress [Exaggerated Use Of Accessory Muscles For Inspiration] : no accessory muscle use [Respiration, Rhythm And Depth] : normal respiratory rhythm and effort [] : no rash [Normal Station and Gait] : the gait and station were normal for the patient's age [Oriented To Time, Place, And Person] : oriented to person, place, and time [No Focal Deficits] : no focal deficits [Mood] : the mood was normal [Affect] : the affect was normal [Not Anxious] : not anxious

## 2024-04-08 ENCOUNTER — APPOINTMENT (OUTPATIENT)
Dept: UROLOGY | Facility: CLINIC | Age: 69
End: 2024-04-08
Payer: MEDICARE

## 2024-04-08 VITALS
HEIGHT: 64 IN | BODY MASS INDEX: 26.8 KG/M2 | SYSTOLIC BLOOD PRESSURE: 115 MMHG | OXYGEN SATURATION: 95 % | DIASTOLIC BLOOD PRESSURE: 72 MMHG | RESPIRATION RATE: 16 BRPM | HEART RATE: 108 BPM | WEIGHT: 157 LBS | TEMPERATURE: 97.8 F

## 2024-04-08 DIAGNOSIS — E11.9 TYPE 2 DIABETES MELLITUS W/OUT COMPLICATIONS: ICD-10-CM

## 2024-04-08 DIAGNOSIS — N40.1 BENIGN PROSTATIC HYPERPLASIA WITH LOWER URINARY TRACT SYMPMS: ICD-10-CM

## 2024-04-08 DIAGNOSIS — N13.8 BENIGN PROSTATIC HYPERPLASIA WITH LOWER URINARY TRACT SYMPMS: ICD-10-CM

## 2024-04-08 DIAGNOSIS — R39.15 URGENCY OF URINATION: ICD-10-CM

## 2024-04-08 DIAGNOSIS — N39.41 URGE INCONTINENCE: ICD-10-CM

## 2024-04-08 PROCEDURE — G2211 COMPLEX E/M VISIT ADD ON: CPT

## 2024-04-08 PROCEDURE — 99214 OFFICE O/P EST MOD 30 MIN: CPT

## 2024-04-08 RX ORDER — AMMONIUM LACTATE 12 %
12 CREAM (GRAM) TOPICAL
Qty: 385 | Refills: 0 | Status: ACTIVE | COMMUNITY
Start: 2024-03-28

## 2024-04-09 ENCOUNTER — OUTPATIENT (OUTPATIENT)
Dept: OUTPATIENT SERVICES | Facility: HOSPITAL | Age: 69
LOS: 1 days | End: 2024-04-09
Payer: MEDICARE

## 2024-04-09 ENCOUNTER — RESULT REVIEW (OUTPATIENT)
Age: 69
End: 2024-04-09

## 2024-04-09 DIAGNOSIS — N43.3 HYDROCELE, UNSPECIFIED: ICD-10-CM

## 2024-04-09 DIAGNOSIS — Z96.649 PRESENCE OF UNSPECIFIED ARTIFICIAL HIP JOINT: Chronic | ICD-10-CM

## 2024-04-09 DIAGNOSIS — Z87.19 PERSONAL HISTORY OF OTHER DISEASES OF THE DIGESTIVE SYSTEM: Chronic | ICD-10-CM

## 2024-04-09 DIAGNOSIS — Z98.890 OTHER SPECIFIED POSTPROCEDURAL STATES: Chronic | ICD-10-CM

## 2024-04-09 PROCEDURE — 76870 US EXAM SCROTUM: CPT | Mod: 26

## 2024-04-09 PROCEDURE — 76870 US EXAM SCROTUM: CPT

## 2024-04-10 DIAGNOSIS — N43.3 HYDROCELE, UNSPECIFIED: ICD-10-CM

## 2024-04-11 PROBLEM — E11.9 DIABETES: Status: ACTIVE | Noted: 2020-05-04

## 2024-04-11 LAB
BILIRUB UR QL STRIP: NORMAL
GLUCOSE UR-MCNC: NORMAL
HCG UR QL: 0.2 EU/DL
HGB UR QL STRIP.AUTO: NORMAL
KETONES UR-MCNC: NORMAL
LEUKOCYTE ESTERASE UR QL STRIP: NORMAL
NITRITE UR QL STRIP: NORMAL
PH UR STRIP: 6
PROT UR STRIP-MCNC: NORMAL
SP GR UR STRIP: 1.02

## 2024-04-11 NOTE — ASSESSMENT
[FreeTextEntry1] : 67 yo male pt with hypersensitive bladder and overactivity. Patient has stopped Gemtesa as he felt is was not helping him. He is interested in trying PTNS. I've discussed with him that we will work on getting him scheduled for PTNS. He understands this and is still agreeable. All of the pt's questions were answered and he will follow up as scheduled. Awaits PSA challenge with Dr. Porter. Does not want biopsy. Total time=30 min. Seen with IRIS Mayberry

## 2024-04-11 NOTE — HISTORY OF PRESENT ILLNESS
[FreeTextEntry1] : 69-year-old male with a hypersensitive bladder with overactivity. The patient was on Gemtesa but has recently discontinued as he felt it was not helping him.  No hematuria, dysuria, UTIs. Has diabetes.

## 2024-05-06 ENCOUNTER — APPOINTMENT (OUTPATIENT)
Dept: UROLOGY | Facility: CLINIC | Age: 69
End: 2024-05-06
Payer: MEDICARE

## 2024-05-06 VITALS
TEMPERATURE: 98 F | OXYGEN SATURATION: 95 % | BODY MASS INDEX: 26.8 KG/M2 | DIASTOLIC BLOOD PRESSURE: 77 MMHG | HEIGHT: 64 IN | WEIGHT: 157 LBS | HEART RATE: 82 BPM | SYSTOLIC BLOOD PRESSURE: 123 MMHG

## 2024-05-06 DIAGNOSIS — N13.8 BENIGN PROSTATIC HYPERPLASIA WITH LOWER URINARY TRACT SYMPMS: ICD-10-CM

## 2024-05-06 DIAGNOSIS — N40.1 BENIGN PROSTATIC HYPERPLASIA WITH LOWER URINARY TRACT SYMPMS: ICD-10-CM

## 2024-05-06 DIAGNOSIS — N43.3 HYDROCELE, UNSPECIFIED: ICD-10-CM

## 2024-05-06 DIAGNOSIS — K42.9 UMBILICAL HERNIA W/OUT OBSTRUCTION OR GANGRENE: ICD-10-CM

## 2024-05-06 DIAGNOSIS — R35.0 FREQUENCY OF MICTURITION: ICD-10-CM

## 2024-05-06 PROCEDURE — 99214 OFFICE O/P EST MOD 30 MIN: CPT

## 2024-05-06 PROCEDURE — G2211 COMPLEX E/M VISIT ADD ON: CPT

## 2024-05-14 PROBLEM — N40.1 BPH WITH OBSTRUCTION/LOWER URINARY TRACT SYMPTOMS: Status: ACTIVE | Noted: 2021-10-12

## 2024-05-14 PROBLEM — N43.3 HYDROCELE IN ADULT: Status: ACTIVE | Noted: 2022-12-16

## 2024-05-14 PROBLEM — R35.0 URINARY FREQUENCY: Status: ACTIVE | Noted: 2021-10-12

## 2024-05-14 PROBLEM — K42.9 UMBILICAL HERNIA: Status: ACTIVE | Noted: 2020-10-20

## 2024-05-14 NOTE — HISTORY OF PRESENT ILLNESS
[FreeTextEntry1] :  69 year old male patient seen in follow-up with frequency and urgency, who is awaiting PTNS and also has a 220 cc right hydrocele. This is becoming bothersome and he wants to discuss options. I reviewed his ultrasound myself.   US Scrotum taken on 04/09/24:  Significant increase in right-sided hydrocele compared to prior examination as described above. Small amount of fluid within the left scrotum. Scattered microlithiasis again noted bilaterally

## 2024-05-14 NOTE — ASSESSMENT
[FreeTextEntry1] :  69 year old male patient with: large right hydrocele with growth over the last year. We discussed surgery, which he does not want to do, and aspiration which there would likely be recurrence. However, he would like to try the aspiration in the office without anesthesia and depending on how he does, he will make decision about whether he wants to proceed with something in the operating room if it recurs. All his questions were otherwise answered. He understands and will follow-up. Total time=30 min. Continuing care.   The submitted E/M billing level for this visit reflects the total time spent on the day of the visit including face-to-face time spent with the patient, non-face-to-face review of medical records and relevant information, documentation, and asynchronous communication with the patient after a visit via phone, email, or patients EHR portal after the visit. The medical records reviewed are either scanned into the chart or reviewed with the patient using a patients electronic medical records portal for patients with records not available to [facility] via electronic transmission platforms from other institutions and labs. Time spent counseling and performing coordination of care was also included in determining the appropriate EM billing level.   I have reviewed and verified information regarding the chief complaint and history recorded by the ancillary staff and/or the patient. I have independently reviewed and interpreted tests performed by other physicians and facilities as necessary.   I have discussed with the patient differential diagnosis, reason for auxiliary tests if ordered, risks, benefits, alternatives, and complications of each form of therapy were discussed.  I personally performed the services described in the documentation, reviewed the documentation recorded by the scribe in my presence, and it accurately and completely records my words and actions.

## 2024-05-14 NOTE — ADDENDUM
[FreeTextEntry1] :  CHIVO VAZQUEZ, am scribing for and in the presence of  in the following sections: HISTORY OF PRESENT ILLNESS, PAST MEDICAL/FAMILY/SOCIAL HISTORY, REVIEW OF SYSTEMS, VITAL SIGNS, PHYSICAL EXAM, ASSESSMENT/PLAN on 05/06/2024.

## 2024-07-01 ENCOUNTER — APPOINTMENT (OUTPATIENT)
Dept: UROLOGY | Facility: CLINIC | Age: 69
End: 2024-07-01
Payer: MEDICARE

## 2024-07-01 DIAGNOSIS — R97.20 ELEVATED PROSTATE, SPECIFIC ANTIGEN [PSA]: ICD-10-CM

## 2024-07-01 DIAGNOSIS — N13.8 BENIGN PROSTATIC HYPERPLASIA WITH LOWER URINARY TRACT SYMPMS: ICD-10-CM

## 2024-07-01 DIAGNOSIS — N43.3 HYDROCELE, UNSPECIFIED: ICD-10-CM

## 2024-07-01 DIAGNOSIS — N40.1 BENIGN PROSTATIC HYPERPLASIA WITH LOWER URINARY TRACT SYMPMS: ICD-10-CM

## 2024-07-01 PROCEDURE — 55000 DRAINAGE OF HYDROCELE: CPT | Mod: LT

## 2024-07-12 ENCOUNTER — APPOINTMENT (OUTPATIENT)
Dept: UROLOGY | Facility: CLINIC | Age: 69
End: 2024-07-12

## 2024-07-12 PROCEDURE — 64566 NEUROELTRD STIM POST TIBIAL: CPT

## 2024-07-19 ENCOUNTER — APPOINTMENT (OUTPATIENT)
Dept: UROLOGY | Facility: CLINIC | Age: 69
End: 2024-07-19

## 2024-07-19 PROCEDURE — 64566 NEUROELTRD STIM POST TIBIAL: CPT

## 2024-07-26 ENCOUNTER — APPOINTMENT (OUTPATIENT)
Dept: UROLOGY | Facility: CLINIC | Age: 69
End: 2024-07-26
Payer: MEDICARE

## 2024-07-26 PROCEDURE — 64566 NEUROELTRD STIM POST TIBIAL: CPT

## 2024-08-02 ENCOUNTER — APPOINTMENT (OUTPATIENT)
Dept: UROLOGY | Facility: CLINIC | Age: 69
End: 2024-08-02
Payer: MEDICARE

## 2024-08-02 VITALS
WEIGHT: 157 LBS | OXYGEN SATURATION: 96 % | HEART RATE: 90 BPM | DIASTOLIC BLOOD PRESSURE: 82 MMHG | SYSTOLIC BLOOD PRESSURE: 132 MMHG | BODY MASS INDEX: 26.8 KG/M2 | TEMPERATURE: 97.9 F | RESPIRATION RATE: 16 BRPM | HEIGHT: 64 IN

## 2024-08-02 DIAGNOSIS — N39.41 URGE INCONTINENCE: ICD-10-CM

## 2024-08-02 DIAGNOSIS — R35.0 FREQUENCY OF MICTURITION: ICD-10-CM

## 2024-08-02 PROCEDURE — 64566 NEUROELTRD STIM POST TIBIAL: CPT

## 2024-08-09 ENCOUNTER — APPOINTMENT (OUTPATIENT)
Dept: UROLOGY | Facility: CLINIC | Age: 69
End: 2024-08-09

## 2024-08-09 PROCEDURE — 64566 NEUROELTRD STIM POST TIBIAL: CPT

## 2024-08-16 ENCOUNTER — APPOINTMENT (OUTPATIENT)
Dept: UROLOGY | Facility: CLINIC | Age: 69
End: 2024-08-16
Payer: MEDICARE

## 2024-08-16 DIAGNOSIS — R35.0 FREQUENCY OF MICTURITION: ICD-10-CM

## 2024-08-16 DIAGNOSIS — N39.41 URGE INCONTINENCE: ICD-10-CM

## 2024-08-16 PROCEDURE — 64566 NEUROELTRD STIM POST TIBIAL: CPT

## 2024-08-23 ENCOUNTER — APPOINTMENT (OUTPATIENT)
Dept: UROLOGY | Facility: CLINIC | Age: 69
End: 2024-08-23

## 2024-08-23 DIAGNOSIS — N40.1 BENIGN PROSTATIC HYPERPLASIA WITH LOWER URINARY TRACT SYMPMS: ICD-10-CM

## 2024-08-23 DIAGNOSIS — E11.9 TYPE 2 DIABETES MELLITUS W/OUT COMPLICATIONS: ICD-10-CM

## 2024-08-23 DIAGNOSIS — N13.8 BENIGN PROSTATIC HYPERPLASIA WITH LOWER URINARY TRACT SYMPMS: ICD-10-CM

## 2024-08-23 PROCEDURE — 64566 NEUROELTRD STIM POST TIBIAL: CPT

## 2024-08-30 ENCOUNTER — APPOINTMENT (OUTPATIENT)
Dept: UROLOGY | Facility: CLINIC | Age: 69
End: 2024-08-30

## 2024-08-30 PROCEDURE — 64566 NEUROELTRD STIM POST TIBIAL: CPT

## 2024-09-05 ENCOUNTER — LABORATORY RESULT (OUTPATIENT)
Age: 69
End: 2024-09-05

## 2024-09-05 ENCOUNTER — NON-APPOINTMENT (OUTPATIENT)
Age: 69
End: 2024-09-05

## 2024-09-05 ENCOUNTER — APPOINTMENT (OUTPATIENT)
Dept: UROLOGY | Facility: CLINIC | Age: 69
End: 2024-09-05
Payer: MEDICARE

## 2024-09-05 VITALS
HEIGHT: 64 IN | BODY MASS INDEX: 27.31 KG/M2 | OXYGEN SATURATION: 97 % | RESPIRATION RATE: 18 BRPM | HEART RATE: 85 BPM | WEIGHT: 160 LBS | DIASTOLIC BLOOD PRESSURE: 77 MMHG | SYSTOLIC BLOOD PRESSURE: 125 MMHG

## 2024-09-05 DIAGNOSIS — N40.1 BENIGN PROSTATIC HYPERPLASIA WITH LOWER URINARY TRACT SYMPMS: ICD-10-CM

## 2024-09-05 DIAGNOSIS — N13.8 BENIGN PROSTATIC HYPERPLASIA WITH LOWER URINARY TRACT SYMPMS: ICD-10-CM

## 2024-09-05 DIAGNOSIS — R97.20 ELEVATED PROSTATE, SPECIFIC ANTIGEN [PSA]: ICD-10-CM

## 2024-09-05 LAB
BILIRUB UR QL STRIP: NORMAL
COLLECTION METHOD: NORMAL
GLUCOSE UR-MCNC: 100
HCG UR QL: 0.2 EU/DL
HGB UR QL STRIP.AUTO: NORMAL
KETONES UR-MCNC: NORMAL
LEUKOCYTE ESTERASE UR QL STRIP: NORMAL
NITRITE UR QL STRIP: NORMAL
PH UR STRIP: 6
PROT UR STRIP-MCNC: NORMAL
SP GR UR STRIP: 1.02

## 2024-09-05 PROCEDURE — G2211 COMPLEX E/M VISIT ADD ON: CPT

## 2024-09-05 PROCEDURE — 99214 OFFICE O/P EST MOD 30 MIN: CPT

## 2024-09-05 NOTE — HISTORY OF PRESENT ILLNESS
[FreeTextEntry1] : MARILYN MORELOS is a 69-year-old male prediabetic, who presents for consultation for BPH/LUTS on rapaflo/finasteride previously seen by outside urologist. Patient has been on finasteride since 2018. PSA stable. Worsening LUTS. Uroflow study demonstrates poor voiding pattern with decreased Q-Blake/average flow and elevated PVR of 197. He status post rezum procedure on 9/23/2022 with urinary retention post. He finally underwent successful trial of void and has been voiding well with some increased urinary frequency/urgency.  He is now following with Dr. Escobar and undergoing PTNS.  PSA rising off of finasteride.  Unable to obtain MRI secondary to his prosthetic.   Pt is seeing Dr. Escobar for LUTS and is undergoing PTNS. He is taking finasteride daily.  States minimal improvement in LUTS / denies flank pain, gross hematuria, dysuria or associated symptoms.   PSA 07/30/2024 - 3.06   previously He discontinued finasteride, back in 2023, as he felt he was taking too many medications. His PSA, from 3/1/2024 is 5.4 and increased from his prior value of 4.2 in October 2023.  Of note, he had an MRI in 2021 which was nondiagnostic secondary to hip prostatic flare.  Previously: His most recent PSA in October 2023 is 4.2 ng/mL. 2018, prior to finasteride his PSA was 6 and 4.5 ng/mL. While on finasteride his PSA was 2.26 ng/mL.  Uroflow study tracings demonstrates prolonged voiding time with plateaued voiding pattern. Q-Blake/average flow is 11.4/7 with a voided volume of 195 cc and the postvoid residual of 197 cc  PSA=2.26, % free = 33% from 2/2022 PSA= 2.48 percent free 41 % from 10/2021  MRI prostate images demonstrating 49 g prostate with mild intravesical protrusion no obvious T2 peripheral zone lesions however significant artifact from hip prosthesis limits evaluation for clinically significant prostate cancer as diffusion-weighted imaging is limited.  Bladder ultrasound images from 10/26/2021 demonstrates a 43 g prostate with a prevoid volume of 170 cc and the postvoid volume of 45g, yielding a PSA density of 0.115, intravesical mild wide protrusion  Denies  PMH including previous kidney stones, recurrent UTIs. Family History: No  malignancies. Brother had TURP procedure. Social History: School safety. Pt reports he is not consuming bladder irritants.  PSA=3.0 percent free 30 from 12/2020 PSA=4.5 11/ 2018 PSA=6.10/ 2018  Prostate TRUS: 45 grams prostate from 2014  UDS from 2018 demonstrates: normal Bladder compliance, max V=121 cc. Voiding phase, max detrusor pressure 194 cc, Qmax= 8 ml/sec. Tracing were reviewed: there is a sustained detrusor contraction, machine does not appear to be completely calibrated, somewhat difficult to interpret.  RBUS images 7/2020: no hydronephrosis bL no stones BL, bladder unremarkable NPT=660 cc. Prostate V not measured however appears to have intravesical protrusion.

## 2024-09-05 NOTE — ASSESSMENT
[FreeTextEntry1] : MARILYN MORELOS is a 69-year-old male prediabetic, who presents for consultation for BPH/LUTS on rapaflo/finasteride previously seen by outside urologist. Patient has been on finasteride since 2018. PSA stable. Worsening LUTS. Uroflow study demonstrates poor voiding pattern with decreased Q-Blake/average flow and elevated PVR of 197. He status post rezum procedure on 9/23/2022 with urinary retention post. He finally underwent successful trial of void and has been voiding well with some increased urinary frequency/urgency.  He is now following with Dr. Escobar and undergoing PTNS.  PSA rising off of finasteride.  Unable to obtain MRI secondary to his prosthetic.   PSA has appropriately cut in half from his historical high value of 6.  We will continue to trend PSA He is aware of importance of follow-up as there is always a possibility he may need a biopsy in the future  - cont f/u with Dr. Escobar for worsening LUTS - cont finasteride stabilization of chronically elevated PSA and BPH. - f/u 6 months with PSA prior. UA, UCx today.

## 2024-09-05 NOTE — ADDENDUM
[FreeTextEntry1] : Patient's note was transcribed with the assistance of a medical scribe under the supervision of Dr. Porter. I, Dr. Porter, have reviewed the patient's chart and agree that it aligns with my medical decisions. Leonides Goodwin, our scribe, also served as a chaperone for physical examination purposes.

## 2024-09-06 ENCOUNTER — APPOINTMENT (OUTPATIENT)
Dept: UROLOGY | Facility: CLINIC | Age: 69
End: 2024-09-06

## 2024-09-06 PROCEDURE — 64566 NEUROELTRD STIM POST TIBIAL: CPT

## 2024-09-13 ENCOUNTER — APPOINTMENT (OUTPATIENT)
Dept: UROLOGY | Facility: CLINIC | Age: 69
End: 2024-09-13
Payer: MEDICARE

## 2024-09-13 PROCEDURE — 64566 NEUROELTRD STIM POST TIBIAL: CPT

## 2024-09-13 RX ORDER — AMOXICILLIN AND CLAVULANATE POTASSIUM 500; 125 MG/1; MG/1
500-125 TABLET, FILM COATED ORAL
Qty: 14 | Refills: 0 | Status: ACTIVE | COMMUNITY
Start: 2024-09-13 | End: 1900-01-01

## 2024-09-15 ENCOUNTER — NON-APPOINTMENT (OUTPATIENT)
Age: 69
End: 2024-09-15

## 2024-09-16 RX ORDER — NITROFURANTOIN (MONOHYDRATE/MACROCRYSTALS) 25; 75 MG/1; MG/1
100 CAPSULE ORAL
Qty: 14 | Refills: 0 | Status: ACTIVE | COMMUNITY
Start: 2024-09-16 | End: 1900-01-01

## 2024-09-20 ENCOUNTER — APPOINTMENT (OUTPATIENT)
Dept: UROLOGY | Facility: CLINIC | Age: 69
End: 2024-09-20
Payer: MEDICARE

## 2024-09-20 DIAGNOSIS — R33.9 RETENTION OF URINE, UNSPECIFIED: ICD-10-CM

## 2024-09-20 PROCEDURE — 64566 NEUROELTRD STIM POST TIBIAL: CPT

## 2024-09-27 ENCOUNTER — APPOINTMENT (OUTPATIENT)
Dept: UROLOGY | Facility: CLINIC | Age: 69
End: 2024-09-27
Payer: MEDICARE

## 2024-09-27 ENCOUNTER — APPOINTMENT (OUTPATIENT)
Dept: UROLOGY | Facility: CLINIC | Age: 69
End: 2024-09-27

## 2024-09-27 DIAGNOSIS — N39.41 URGE INCONTINENCE: ICD-10-CM

## 2024-09-27 DIAGNOSIS — R39.15 URGENCY OF URINATION: ICD-10-CM

## 2024-09-27 DIAGNOSIS — R35.0 FREQUENCY OF MICTURITION: ICD-10-CM

## 2024-09-27 PROCEDURE — 64566 NEUROELTRD STIM POST TIBIAL: CPT

## 2024-11-05 ENCOUNTER — NON-APPOINTMENT (OUTPATIENT)
Age: 69
End: 2024-11-05

## 2024-11-05 ENCOUNTER — APPOINTMENT (OUTPATIENT)
Dept: UROLOGY | Facility: CLINIC | Age: 69
End: 2024-11-05
Payer: MEDICARE

## 2024-11-05 VITALS
RESPIRATION RATE: 17 BRPM | SYSTOLIC BLOOD PRESSURE: 157 MMHG | DIASTOLIC BLOOD PRESSURE: 88 MMHG | BODY MASS INDEX: 27.31 KG/M2 | HEART RATE: 81 BPM | HEIGHT: 64 IN | OXYGEN SATURATION: 95 % | WEIGHT: 160 LBS | TEMPERATURE: 98 F

## 2024-11-05 DIAGNOSIS — E11.9 TYPE 2 DIABETES MELLITUS W/OUT COMPLICATIONS: ICD-10-CM

## 2024-11-05 DIAGNOSIS — R82.90 UNSPECIFIED ABNORMAL FINDINGS IN URINE: ICD-10-CM

## 2024-11-05 DIAGNOSIS — N43.3 HYDROCELE, UNSPECIFIED: ICD-10-CM

## 2024-11-05 DIAGNOSIS — N13.8 BENIGN PROSTATIC HYPERPLASIA WITH LOWER URINARY TRACT SYMPMS: ICD-10-CM

## 2024-11-05 DIAGNOSIS — R39.12 POOR URINARY STREAM: ICD-10-CM

## 2024-11-05 DIAGNOSIS — R97.20 ELEVATED PROSTATE, SPECIFIC ANTIGEN [PSA]: ICD-10-CM

## 2024-11-05 DIAGNOSIS — N40.1 BENIGN PROSTATIC HYPERPLASIA WITH LOWER URINARY TRACT SYMPMS: ICD-10-CM

## 2024-11-05 PROCEDURE — 99214 OFFICE O/P EST MOD 30 MIN: CPT

## 2024-11-05 PROCEDURE — G2211 COMPLEX E/M VISIT ADD ON: CPT

## 2024-11-10 LAB
BILIRUB UR QL STRIP: NORMAL
COLLECTION METHOD: NORMAL
GLUCOSE UR-MCNC: 100
HCG UR QL: 0.2 EU/DL
HGB UR QL STRIP.AUTO: NORMAL
KETONES UR-MCNC: NORMAL
LEUKOCYTE ESTERASE UR QL STRIP: NORMAL
NITRITE UR QL STRIP: NORMAL
PH UR STRIP: 7
PROT UR STRIP-MCNC: NORMAL
SP GR UR STRIP: 1.02

## 2024-12-16 ENCOUNTER — APPOINTMENT (OUTPATIENT)
Dept: UROLOGY | Facility: CLINIC | Age: 69
End: 2024-12-16
Payer: MEDICARE

## 2024-12-16 DIAGNOSIS — E11.9 TYPE 2 DIABETES MELLITUS W/OUT COMPLICATIONS: ICD-10-CM

## 2024-12-16 DIAGNOSIS — N43.3 HYDROCELE, UNSPECIFIED: ICD-10-CM

## 2024-12-16 DIAGNOSIS — N40.1 BENIGN PROSTATIC HYPERPLASIA WITH LOWER URINARY TRACT SYMPMS: ICD-10-CM

## 2024-12-16 DIAGNOSIS — N13.8 BENIGN PROSTATIC HYPERPLASIA WITH LOWER URINARY TRACT SYMPMS: ICD-10-CM

## 2024-12-16 DIAGNOSIS — N45.2 ORCHITIS: ICD-10-CM

## 2024-12-16 PROCEDURE — 76870 US EXAM SCROTUM: CPT

## 2024-12-16 PROCEDURE — 55000 DRAINAGE OF HYDROCELE: CPT | Mod: LT

## 2025-03-06 ENCOUNTER — APPOINTMENT (OUTPATIENT)
Dept: UROLOGY | Facility: CLINIC | Age: 70
End: 2025-03-06
Payer: MEDICARE

## 2025-03-06 VITALS
DIASTOLIC BLOOD PRESSURE: 81 MMHG | SYSTOLIC BLOOD PRESSURE: 129 MMHG | HEIGHT: 64 IN | TEMPERATURE: 97.7 F | HEART RATE: 79 BPM | WEIGHT: 160 LBS | RESPIRATION RATE: 18 BRPM | OXYGEN SATURATION: 97 % | BODY MASS INDEX: 27.31 KG/M2

## 2025-03-06 DIAGNOSIS — R97.20 ELEVATED PROSTATE, SPECIFIC ANTIGEN [PSA]: ICD-10-CM

## 2025-03-06 DIAGNOSIS — R39.15 URGENCY OF URINATION: ICD-10-CM

## 2025-03-06 DIAGNOSIS — R35.0 FREQUENCY OF MICTURITION: ICD-10-CM

## 2025-03-06 DIAGNOSIS — N13.8 BENIGN PROSTATIC HYPERPLASIA WITH LOWER URINARY TRACT SYMPMS: ICD-10-CM

## 2025-03-06 DIAGNOSIS — N40.1 BENIGN PROSTATIC HYPERPLASIA WITH LOWER URINARY TRACT SYMPMS: ICD-10-CM

## 2025-03-06 PROCEDURE — G2211 COMPLEX E/M VISIT ADD ON: CPT

## 2025-03-06 PROCEDURE — 99214 OFFICE O/P EST MOD 30 MIN: CPT

## 2025-03-17 ENCOUNTER — APPOINTMENT (OUTPATIENT)
Dept: UROLOGY | Facility: CLINIC | Age: 70
End: 2025-03-17
Payer: MEDICARE

## 2025-03-17 DIAGNOSIS — N13.8 BENIGN PROSTATIC HYPERPLASIA WITH LOWER URINARY TRACT SYMPMS: ICD-10-CM

## 2025-03-17 DIAGNOSIS — R35.0 FREQUENCY OF MICTURITION: ICD-10-CM

## 2025-03-17 DIAGNOSIS — R97.20 ELEVATED PROSTATE, SPECIFIC ANTIGEN [PSA]: ICD-10-CM

## 2025-03-17 DIAGNOSIS — N43.3 HYDROCELE, UNSPECIFIED: ICD-10-CM

## 2025-03-17 DIAGNOSIS — N39.41 URGE INCONTINENCE: ICD-10-CM

## 2025-03-17 DIAGNOSIS — E11.9 TYPE 2 DIABETES MELLITUS W/OUT COMPLICATIONS: ICD-10-CM

## 2025-03-17 DIAGNOSIS — N40.1 BENIGN PROSTATIC HYPERPLASIA WITH LOWER URINARY TRACT SYMPMS: ICD-10-CM

## 2025-03-17 LAB
BILIRUB UR QL STRIP: NORMAL
COLLECTION METHOD: NORMAL
GLUCOSE UR-MCNC: 100
HCG UR QL: 0.2 EU/DL
HGB UR QL STRIP.AUTO: NORMAL
KETONES UR-MCNC: NORMAL
LEUKOCYTE ESTERASE UR QL STRIP: NORMAL
NITRITE UR QL STRIP: NORMAL
PH UR STRIP: 7
PROT UR STRIP-MCNC: 30
SP GR UR STRIP: 1.02

## 2025-03-17 PROCEDURE — 99214 OFFICE O/P EST MOD 30 MIN: CPT

## 2025-03-17 PROCEDURE — G2211 COMPLEX E/M VISIT ADD ON: CPT

## 2025-06-16 ENCOUNTER — APPOINTMENT (OUTPATIENT)
Dept: UROLOGY | Facility: CLINIC | Age: 70
End: 2025-06-16
Payer: MEDICARE

## 2025-06-16 PROCEDURE — 55000 DRAINAGE OF HYDROCELE: CPT | Mod: LT

## 2025-06-16 PROCEDURE — 76870 US EXAM SCROTUM: CPT
